# Patient Record
Sex: MALE | Race: WHITE | Employment: OTHER | URBAN - METROPOLITAN AREA
[De-identification: names, ages, dates, MRNs, and addresses within clinical notes are randomized per-mention and may not be internally consistent; named-entity substitution may affect disease eponyms.]

---

## 2018-06-26 ENCOUNTER — HOSPITAL ENCOUNTER (OUTPATIENT)
Age: 58
Discharge: HOME | End: 2018-06-28
Payer: COMMERCIAL

## 2018-06-26 PROCEDURE — 83036 HEMOGLOBIN GLYCOSYLATED A1C: CPT

## 2018-06-27 LAB — HBA1C MFR BLD: 7.3 % (ref 4–5.6)

## 2019-10-22 ENCOUNTER — HOSPITAL ENCOUNTER (OUTPATIENT)
Age: 59
Discharge: HOME | End: 2019-10-24
Payer: COMMERCIAL

## 2019-10-22 ENCOUNTER — HOSPITAL ENCOUNTER (OUTPATIENT)
Dept: GENERAL RADIOLOGY | Age: 59
Discharge: HOME | End: 2019-10-24
Payer: COMMERCIAL

## 2019-10-22 ENCOUNTER — HOSPITAL ENCOUNTER (OUTPATIENT)
Age: 59
Discharge: HOME | End: 2019-10-22
Payer: COMMERCIAL

## 2019-10-22 DIAGNOSIS — N20.0 KIDNEY STONE: ICD-10-CM

## 2019-10-22 LAB
ANION GAP SERPL CALCULATED.3IONS-SCNC: 10 MMOL/L (ref 7–16)
BUN BLDV-MCNC: 12 MG/DL (ref 6–20)
CALCIUM SERPL-MCNC: 9.3 MG/DL (ref 8.6–10.2)
CHLORIDE BLD-SCNC: 101 MMOL/L (ref 98–107)
CO2: 30 MMOL/L (ref 22–29)
CREAT SERPL-MCNC: 0.8 MG/DL (ref 0.7–1.2)
GFR AFRICAN AMERICAN: >60
GFR NON-AFRICAN AMERICAN: >60 ML/MIN/1.73
GLUCOSE BLD-MCNC: 162 MG/DL (ref 74–99)
POTASSIUM SERPL-SCNC: 4.4 MMOL/L (ref 3.5–5)
PROSTATE SPECIFIC ANTIGEN: 1.86 NG/ML (ref 0–4)
SODIUM BLD-SCNC: 141 MMOL/L (ref 132–146)
URIC ACID, SERUM: 3.2 MG/DL (ref 3.4–7)

## 2019-10-22 PROCEDURE — 80048 BASIC METABOLIC PNL TOTAL CA: CPT

## 2019-10-22 PROCEDURE — 36415 COLL VENOUS BLD VENIPUNCTURE: CPT

## 2019-10-22 PROCEDURE — 74018 RADEX ABDOMEN 1 VIEW: CPT

## 2019-10-22 PROCEDURE — 84153 ASSAY OF PSA TOTAL: CPT

## 2019-10-22 PROCEDURE — 84550 ASSAY OF BLOOD/URIC ACID: CPT

## 2019-12-10 ENCOUNTER — TELEPHONE (OUTPATIENT)
Dept: ADMINISTRATIVE | Age: 59
End: 2019-12-10

## 2020-02-12 ENCOUNTER — OFFICE VISIT (OUTPATIENT)
Dept: ENDOCRINOLOGY | Age: 60
End: 2020-02-12
Payer: COMMERCIAL

## 2020-02-12 VITALS
HEIGHT: 70 IN | SYSTOLIC BLOOD PRESSURE: 136 MMHG | DIASTOLIC BLOOD PRESSURE: 88 MMHG | HEART RATE: 78 BPM | OXYGEN SATURATION: 98 % | WEIGHT: 223 LBS | BODY MASS INDEX: 31.92 KG/M2 | RESPIRATION RATE: 16 BRPM

## 2020-02-12 PROCEDURE — G8484 FLU IMMUNIZE NO ADMIN: HCPCS | Performed by: INTERNAL MEDICINE

## 2020-02-12 PROCEDURE — 2022F DILAT RTA XM EVC RTNOPTHY: CPT | Performed by: INTERNAL MEDICINE

## 2020-02-12 PROCEDURE — 3046F HEMOGLOBIN A1C LEVEL >9.0%: CPT | Performed by: INTERNAL MEDICINE

## 2020-02-12 PROCEDURE — 4004F PT TOBACCO SCREEN RCVD TLK: CPT | Performed by: INTERNAL MEDICINE

## 2020-02-12 PROCEDURE — 99204 OFFICE O/P NEW MOD 45 MIN: CPT | Performed by: INTERNAL MEDICINE

## 2020-02-12 PROCEDURE — 3017F COLORECTAL CA SCREEN DOC REV: CPT | Performed by: INTERNAL MEDICINE

## 2020-02-12 PROCEDURE — G8427 DOCREV CUR MEDS BY ELIG CLIN: HCPCS | Performed by: INTERNAL MEDICINE

## 2020-02-12 PROCEDURE — G8417 CALC BMI ABV UP PARAM F/U: HCPCS | Performed by: INTERNAL MEDICINE

## 2020-02-12 RX ORDER — LOSARTAN POTASSIUM 100 MG/1
100 TABLET ORAL DAILY
COMMUNITY
End: 2021-08-24

## 2020-02-12 RX ORDER — ZOLPIDEM TARTRATE 10 MG/1
5 TABLET ORAL NIGHTLY PRN
COMMUNITY

## 2020-02-12 RX ORDER — GLIMEPIRIDE 2 MG/1
2 TABLET ORAL
COMMUNITY
End: 2020-02-12

## 2020-02-12 RX ORDER — ATORVASTATIN CALCIUM 40 MG/1
40 TABLET, FILM COATED ORAL DAILY
COMMUNITY

## 2020-02-12 RX ORDER — METFORMIN HYDROCHLORIDE 500 MG/1
2000 TABLET, EXTENDED RELEASE ORAL
COMMUNITY
End: 2021-08-24 | Stop reason: SDUPTHER

## 2020-02-12 RX ORDER — GLIPIZIDE 10 MG/1
10 TABLET, FILM COATED, EXTENDED RELEASE ORAL DAILY
Qty: 30 TABLET | Refills: 5 | Status: SHIPPED
Start: 2020-02-12 | End: 2020-06-16 | Stop reason: SDUPTHER

## 2020-02-12 NOTE — PATIENT INSTRUCTIONS
Recommendations for today's visit  · Continue Metformin 2000 mg daily   · Stop Glimepiride   · Start Glipizide extended release 10 mg daily   · Check Blood sugar 2 times/day and send us sugar log in a week   · Onetouch ultra, Freestyle Lite,     These are your blood sugar, blood pressure, cholesterol and A1c goals:  · Blood sugar fastin mg/dl to 130 mg/dl  · Blood sugar before meals: <150 mg/dl  · Peak blood sugar lower than 180 mg/dl  · Bad cholesterol (LDL cholesterol): less than 100 mg/dl  · Blood pressure: less than 140/80 mmHg\  · A1c: between 6.5 - 7%      Steps for managing low blood sugar  1. Eat 15 grams of glucose of simple carbohydrate, as found in:   1 tablespoon sugar, Loretta or corn syrup    4 oz (1/2 cup) of juice or regular soda   Glucose Tablet or gel (follow package instruction)   2. Wait 15 min and check blood sugar again   3. Repeat until blood sugar within range  4. Once within range, follow up with snack or meal within 1 hour      I you have any questions please call Dr. Mary Dennis office       Pedro Bean MD  Endocrinologist, [facility])   [facility] [address] [place] [address]   Phone: [phone]  Fax: [phone]  Email: [email or web address]

## 2020-02-12 NOTE — PROGRESS NOTES
[address] [place] [address] Department of Endocrinology Diabetes and Metabolism   [address] [facility] [address]   Phone: [phone]  Fax: [phone]    Date of Service: 2/12/2020    Primary Care Physician: Eric Burleson MD  Referring physician: Erlinda Tamayo MD  Provider: Krishna Shane MD     Reason for the visit:  DM type 2     History of Present Illness: The history is provided by the patient. No  was used. Accuracy of the patient data is excellent. Blanco Ledezma is a very pleasant 64 y.o. male seen today for diabetes management     Blanco Ledezma was diagnosed with diabetes at age 50   The patient is currently on Metformin 500 mg 2000 Mg daily, Glimepiride 2 mg daily   The patient has been checking blood sugar daiy in am. I reviewed point-of-care blood glucose levels   Most recent A1c results summarized below  Lab Results   Component Value Date    LABA1C 7.3 06/26/2018    LABA1C 7.3 10/08/2012     Patient has had no hypoglycemic episodes   The patient has been mindful of what has been eating and following diabetic diet as encouraged  The patient hasn't been mindful of what has been eating and wasn't following diabetes diet as encouraged   I reviewed current medications and the patient has no issues with diabetes medications  Blanco Ledezma is up to date with eye exam and denied any history of diabetic retinopathy  The patient seeing podiatrist every 4 weeks   Microvascular complications:  No Retinopathy, Nephropathy + Neuropathy   Macrovascular complications: no CAD, PVD, or Stroke  The patient refuses Flushot and pneumonia vaccine     PAST MEDICAL HISTORY   Past Medical History:   Diagnosis Date    Hypertension      PAST SURGICAL HISTORY   No past surgical history on file. SOCIAL HISTORY   Tobacco:   reports that he has never smoked. He has never used smokeless tobacco.  Alcohol:   reports current alcohol use.   Drugs:   reports no history of drug without long-term current use of insulin (HCC)  glipiZIDE (GLUCOTROL XL) 10 MG extended release tablet    Ambulatory referral to Diabetic Education     Larisa Rivera MD  Endocrinologist, [facility] Diabetes Care and Endocrinology   [facility] [address] [facility] [address]   Phone: [phone]  Fax: [phone]  --------------------------------------------  Electronically signed by Madalyn Prado MD

## 2020-02-12 NOTE — LETTER
[address] [place] [address] Department of Endocrinology Diabetes and Metabolism   [address] [place] [address]   Phone: [phone]  Fax: [phone]      Provider: Arlette Jacobsen MD  Primary Care Physician: Rafael Crisostomo MD   Referring Provider: Enrrique Herrera MD    Patient: Emeli Boyd  YOB: 1960  Date of Visit: 2/12/2020      Dear Dr. Rafael Crisostomo MD   I had the pleasure of seeing your patient Emeli Boyd today at endocrine clinic for consultation visit and I enclosed a copy of the office visit completed today. Thank you very much for asking us to participate in the care of this very pleasant patient. Please don't hesitate to call if there are any further questions or concerns. Sincerely   Arlette Jacobsen MD  Endocrinologist, [address] [address]   Phone: [phone]  Fax: [phone]      [address] [place] [address] Department of Endocrinology Diabetes and Metabolism   [address] [place] [address]   Phone: [phone]  Fax: [phone]    Date of Service: 2/12/2020    Primary Care Physician: Rafael Crisostomo MD  Referring physician: Enrrique Herrera MD  Provider: Arlette Jacobsen MD     Reason for the visit:  DM type 2     History of Present Illness: The history is provided by the patient. No  was used. Accuracy of the patient data is excellent.   Emeli Boyd is a very pleasant 64 y.o. male seen today for diabetes management     Emeli Boyd was diagnosed with diabetes at age 50   The patient is currently on Metformin 500 mg 2000 Mg daily, Glimepiride 2 mg daily   The patient has been checking blood sugar daiy in am. I reviewed point-of-care blood glucose levels   Most recent A1c results summarized below  Lab Results   Component Value Date    LABA1C 7.3 06/26/2018    LABA1C 7.3 10/08/2012     Patient has had no hypoglycemic episodes No current facility-administered medications for this visit. Review of Systems  Constitutional: No fever, no chills, no diaphoresis, no generalized weakness. HEENT: No blurred vision, No sore throat, no ear pain, no hair loss  Neck: denied any neck swelling, difficulty swallowing,   Cardio-pulmonary: No CP, SOB or palpitation, No orthopnea or PND. No cough or wheezing. GI: No N/V/D, no constipation, No abdominal pain, no melena or hematochezia   : Denied any dysuria, hematuria, flank pain, discharge, or incontinence. Skin: denied any rash, ulcer, Hirsute, or hyperpigmentation. MSK: denied any joint deformity, joint pain/swelling, muscle pain, or back pain. Neuro: no numbness, no tingling, no weakness, _    OBJECTIVE    /88 (Site: Right Upper Arm, Position: Sitting, Cuff Size: Medium Adult)   Pulse 78   Resp 16   Ht 5' 10\" (1.778 m)   Wt 223 lb (101.2 kg)   SpO2 98%   BMI 32.00 kg/m²    BP Readings from Last 4 Encounters:   02/12/20 136/88     Wt Readings from Last 6 Encounters:   02/12/20 223 lb (101.2 kg)       Physical examination:  General: awake alert, oriented x3, no abnormal position or movements. HEENT: normocephalic non-traumatic, no exophthalmos   Neck: supple, no LN enlargement, no thyromegaly, no thyroid tenderness, no JVD. Pulm: Clear equal air entry no added sounds, no wheezing or rhonchi    CVS: S1 + S2, no murmur, no heave. Dorsalis pedis pulse palpable   Abd: soft lax, no tenderness, no organomegaly, audible bowel sounds. Skin: warm, no lesions, no rash.  No callus, no Ulcers, No acanthosis nigricans  Musculoskeletal: No back tenderness, no kyphosis/scoliosis    Neuro: CN intact, Monofilament sensation decreased bilateral , muscle power normal  Psych: normal mood, and affect      Review of Laboratory Data:  I personally reviewed the following lab:  Lab Results   Component Value Date/Time    WBC 5.7 10/08/2012 11:46 AM    RBC 4.82 10/08/2012 11:46 AM HGB 14.4 10/08/2012 11:46 AM    HCT 42.9 10/08/2012 11:46 AM    MCV 89.0 10/08/2012 11:46 AM    MCH 29.9 10/08/2012 11:46 AM    MCHC 33.5 10/08/2012 11:46 AM    RDW 13.9 10/08/2012 11:46 AM     10/08/2012 11:46 AM    MPV 9.7 10/08/2012 11:46 AM      Lab Results   Component Value Date/Time     10/22/2019 11:06 AM    K 4.4 10/22/2019 11:06 AM    CO2 30 (H) 10/22/2019 11:06 AM    BUN 12 10/22/2019 11:06 AM    CREATININE 0.8 10/22/2019 11:06 AM    CALCIUM 9.3 10/22/2019 11:06 AM    LABGLOM >60 10/22/2019 11:06 AM    GFRAA >60 10/22/2019 11:06 AM      Lab Results   Component Value Date/Time    TSH 1.339 10/08/2012 11:46 AM     Lab Results   Component Value Date    LABA1C 7.3 06/26/2018    GLUCOSE 162 10/22/2019     Lab Results   Component Value Date    LABA1C 7.3 06/26/2018    LABA1C 7.3 10/08/2012     Lab Results   Component Value Date    TRIG 74 10/08/2012    HDL 61.3 10/08/2012    LDLCALC 122 10/08/2012    CHOL 198 10/08/2012     No results found for: [address] [place] [address] [address] Records/Labs/Images review:   I personally reviewed and summarized previous records   All labs and imaging studies were independently reviewed     [address] [place] [address], a 64 y.o.-old male seen in for the following issues     Diabetes Mellitus Type 2     · A1c 7.3%   · Continue Metformin 2000 mg daily   · Start Glipizide extended release 10 mg daily   · Check Blood sugar 2 times/day and send us sugar log in a week  · Discussed with patient A1c and blood sugar goals   · Optimal blood sugars: 100-140 pre-prandial, < 180 peak post-prandial  · The patient counseled about the complications of uncontrolled diabetes   · Patient was counselled about the importance of self-blood glucose monitoring and eating consistent carb diet to avoid blood sugar fluctuations   · Patient will need routine diabetes maintenance and prevention  · The patient was referred to diabetic educator for further teaching · Discussed lifestyle changes including diet and exercise with patient; recommended 150 minutes of moderate intensity exercise per week. · Diabetes labs before next visit     Dietary noncompliance  ? Discussed with patient the importance of eating consistent carbohydrate meals, avoiding high glycemic index food. Also, discussed with patient the risk and negative consequences of dietary noncompliance on blood glucose control, blood pressure and weight    Obesity  ? Discussed lifestyle changes including diet and exercise with patient in depth. Also discussed with patient cardiovascular risk associated with obesity    Return in about 4 months (around 6/12/2020) for DM type 2 . The above issues were reviewed with the patient who understood and agreed with the plan    Thank you for allowing us to participate in the care of this patient. Please do not hesitate to contact us with any additional questions. Diagnosis Orders   1.  Type 2 diabetes mellitus without complication, without long-term current use of insulin (HCC)  glipiZIDE (GLUCOTROL XL) 10 MG extended release tablet    Ambulatory referral to Diabetic Education     Giselle Medrano MD  Endocrinologist, [facility] Diabetes Care and Endocrinology   [address] [facility] [address]   Phone: [phone]  Fax: [phone]  --------------------------------------------  Electronically signed by Irene Grimm MD

## 2020-02-19 ENCOUNTER — HOSPITAL ENCOUNTER (OUTPATIENT)
Dept: DIABETES SERVICES | Age: 60
Setting detail: THERAPIES SERIES
Discharge: HOME | End: 2020-02-19
Payer: COMMERCIAL

## 2020-02-19 VITALS — HEIGHT: 70 IN | BODY MASS INDEX: 30.78 KG/M2 | WEIGHT: 215 LBS

## 2020-02-19 PROCEDURE — 97802 MEDICAL NUTRITION INDIV IN: CPT | Performed by: DIETITIAN, REGISTERED

## 2020-02-19 SDOH — ECONOMIC STABILITY: FOOD INSECURITY: ADDITIONAL INFORMATION: NO

## 2020-02-19 ASSESSMENT — PATIENT HEALTH QUESTIONNAIRE - PHQ9
1. LITTLE INTEREST OR PLEASURE IN DOING THINGS: 0
SUM OF ALL RESPONSES TO PHQ QUESTIONS 1-9: 0
SUM OF ALL RESPONSES TO PHQ9 QUESTIONS 1 & 2: 0
SUM OF ALL RESPONSES TO PHQ QUESTIONS 1-9: 0
2. FEELING DOWN, DEPRESSED OR HOPELESS: 0

## 2020-02-19 NOTE — PROGRESS NOTES
Diabetes Self-Management Education Record    Participant Name: Ted Valiente  Referring Provider: Duane Gals, MD  Assessment/Evaluation Ratings:  1=Needs Instruction   4=Demonstrates Understanding/Competency  2=Needs Review   NC=Not Covered    3=Comprehends Key Points  N/A=Not Applicable  Topics/Learning Objectives Pre-session Assess Date:  2/19/20-JA Instr. Date Reinforce Date Post- session Eval Comments   Diabetes disease process & Treatment process: Define diabetes & prediabetes; Identify own type of diabetes; role of the pancreas; signs/symptoms; diagnostic criteria; prevention & treatment options; contributing factors. 1    Type 2, diagnosed 4 - 5 years ago   Incorporating nutritional management into lifestyle: Describe effect of type, amount & timing of food on blood glucose; Describe basic meal planning techniques & current nutrition guidelines;Correctly read food labels & demonstrate CHO counting & portion control with personalized meal plan. Identify dining out strategies, & dietary sick day guidelines. 1 2/19/2020-JA  3 2/19/2020 attended with veda Andres. Instructed on 2100 calorie meal plan, 4 carbohydrate choices at each meal and 1 - 2 in between each meal. 10 oz protein, 6 fats daily. Provided alcohol guidelines. Pt admits he snacks frequently and eat larger portions at meals    Incorporating physical activity into lifestyle:   Verbalize effect of exercise on blood glucose levels; benefits of regular exercise; safety considerations; contraindications; maintenance of activity. 1    Not active   Using medications safely:  Identify effects of diabetes medicines on blood glucose levels; List diabetes medication taken, action & side effects; appropriate injection sites; proper storage; supplies needed; proper technique; safe needle disposal guidelines.  1    Metformin 2000 XL at breakfast  Glipizide XL at breakfast   Monitoring blood glucose, interpreting and using results:  Identify recommended & personal 15:00     Meter Instrx        Insulin Instrx            Additional Comments:    DSMES Follow-up plan/Date:   Contact Post Class Regarding:   HgbA1C   Weight   Hypertension  Follow-up with Physician  Medication compliance   Plate method/meal plan compliance   Self-Foot Exam Frequency   Monitoring Frequency   Exercise Routine   Goal Attainment  Personal Support Plan:      [x] Keep all scheduled doctor appointments   [] Make and keep appointments with specialists (foot, eye, dentist) as recommended   [] Consult my pharmacist about all new medications or to ask any medication questions   [] Get tested for sleep apnea   [] Seek help for:   [] Make an appointment with:   [] Attend smoking cessation classes or call 1-800-QUIT-NOW  [] Attend Diabetes Support Group   [] Use diabetes magazines, books, or credible web-sites like the ADA for more information  [] Increase exercise at home or join an exercise program:   [] Other:

## 2020-02-24 ENCOUNTER — TELEPHONE (OUTPATIENT)
Dept: ENDOCRINOLOGY | Age: 60
End: 2020-02-24

## 2020-02-24 RX ORDER — GLIPIZIDE 5 MG/1
TABLET, FILM COATED, EXTENDED RELEASE ORAL
Qty: 90 TABLET | Refills: 3 | Status: SHIPPED
Start: 2020-02-24 | End: 2020-06-16

## 2020-06-16 ENCOUNTER — VIRTUAL VISIT (OUTPATIENT)
Dept: ENDOCRINOLOGY | Age: 60
End: 2020-06-16
Payer: COMMERCIAL

## 2020-06-16 PROBLEM — E11.9 TYPE 2 DIABETES MELLITUS WITHOUT COMPLICATION, WITHOUT LONG-TERM CURRENT USE OF INSULIN (HCC): Status: ACTIVE | Noted: 2020-06-16

## 2020-06-16 PROBLEM — E55.9 VITAMIN D DEFICIENCY: Status: ACTIVE | Noted: 2020-06-16

## 2020-06-16 PROBLEM — E78.5 HYPERLIPIDEMIA: Status: ACTIVE | Noted: 2020-06-16

## 2020-06-16 PROCEDURE — 4004F PT TOBACCO SCREEN RCVD TLK: CPT | Performed by: INTERNAL MEDICINE

## 2020-06-16 PROCEDURE — 3017F COLORECTAL CA SCREEN DOC REV: CPT | Performed by: INTERNAL MEDICINE

## 2020-06-16 PROCEDURE — 99214 OFFICE O/P EST MOD 30 MIN: CPT | Performed by: INTERNAL MEDICINE

## 2020-06-16 PROCEDURE — G8417 CALC BMI ABV UP PARAM F/U: HCPCS | Performed by: INTERNAL MEDICINE

## 2020-06-16 PROCEDURE — 2022F DILAT RTA XM EVC RTNOPTHY: CPT | Performed by: INTERNAL MEDICINE

## 2020-06-16 PROCEDURE — G8427 DOCREV CUR MEDS BY ELIG CLIN: HCPCS | Performed by: INTERNAL MEDICINE

## 2020-06-16 PROCEDURE — 3046F HEMOGLOBIN A1C LEVEL >9.0%: CPT | Performed by: INTERNAL MEDICINE

## 2020-06-16 RX ORDER — GLIPIZIDE 10 MG/1
TABLET, FILM COATED, EXTENDED RELEASE ORAL
Qty: 180 TABLET | Refills: 1 | Status: SHIPPED
Start: 2020-06-16 | End: 2020-12-01 | Stop reason: SDUPTHER

## 2020-06-16 RX ORDER — PANTOPRAZOLE SODIUM 40 MG/1
40 TABLET, DELAYED RELEASE ORAL DAILY
COMMUNITY

## 2020-06-16 NOTE — PROGRESS NOTES
TeleHealth encounter, evaluation of the following organ systems is limited: EENT/Resp/CV/GI//MS/Neuro/Skin/Heme-Lymph-Imm. General: awake alert, oriented x3, no abnormal position or movements.   Pulm: move with respiration   Skin: no rash  Psych: normal mood, and affect      Review of Laboratory Data:  I personally reviewed the following lab:  Lab Results   Component Value Date/Time    WBC 5.7 10/08/2012 11:46 AM    RBC 4.82 10/08/2012 11:46 AM    HGB 14.4 10/08/2012 11:46 AM    HCT 42.9 10/08/2012 11:46 AM    MCV 89.0 10/08/2012 11:46 AM    MCH 29.9 10/08/2012 11:46 AM    MCHC 33.5 10/08/2012 11:46 AM    RDW 13.9 10/08/2012 11:46 AM     10/08/2012 11:46 AM    MPV 9.7 10/08/2012 11:46 AM      Lab Results   Component Value Date/Time     10/22/2019 11:06 AM    K 4.4 10/22/2019 11:06 AM    CO2 30 (H) 10/22/2019 11:06 AM    BUN 12 10/22/2019 11:06 AM    CREATININE 0.8 10/22/2019 11:06 AM    CALCIUM 9.3 10/22/2019 11:06 AM    LABGLOM >60 10/22/2019 11:06 AM    GFRAA >60 10/22/2019 11:06 AM      Lab Results   Component Value Date/Time    TSH 1.339 10/08/2012 11:46 AM     Lab Results   Component Value Date    LABA1C 7.3 06/26/2018    GLUCOSE 162 10/22/2019     Lab Results   Component Value Date    LABA1C 7.3 06/26/2018    LABA1C 7.3 10/08/2012     Lab Results   Component Value Date    TRIG 74 10/08/2012    HDL 61.3 10/08/2012    LDLCALC 122 10/08/2012    CHOL 198 10/08/2012     No results found for: [address] [place] [address] [address] Records/Labs/Images review:   I personally reviewed and summarized previous records   All labs and imaging studies were independently reviewed     ASSESSMENT & RECOMMENDATIONS   Jude Merino, a 64 y.o.-old male seen in for the following issues     Diabetes Mellitus Type 2     · A1c 7.3%   · Continue Metformin 2000 mg daily , change Glipizide extended release to 20 mg daily   · Check Blood sugar 2 times/day and send us sugar log in a week  · Discussed with patient A1c and blood sugar goals · Patient will need routine diabetes maintenance and prevention  · Diabetes labs before next visit     Dietary noncompliance   Improved since last visit    Discussed with patient the importance of eating consistent carbohydrate meals, avoiding high glycemic index food. Also, discussed with patient the risk and negative consequences of dietary noncompliance on blood glucose control, blood pressure and weight    HLD   · Continue Lipitor 40 mg daily  · Lipid panel before next visit      No follow-ups on file. The above issues were reviewed with the patient who understood and agreed with the plan    Thank you for allowing us to participate in the care of this patient. Please do not hesitate to contact us with any additional questions. Diagnosis Orders   1. Vitamin D deficiency  Vitamin D 25 Hydroxy   2. Type 2 diabetes mellitus without complication, without long-term current use of insulin (HCC)  glipiZIDE (GLUCOTROL XL) 10 MG extended release tablet    Basic Metabolic Panel    Hemoglobin A1C    Microalbumin / Creatinine Urine Ratio   3.  Hyperlipidemia, unspecified hyperlipidemia type  Lipid Panel     Radha Stewart MD  Endocrinologist, [facility] Diabetes Care and Endocrinology   [address] [place] [address]   Phone: [phone]  Fax: [phone]  --------------------------------------------  Electronically signed by Edvin Granados MD

## 2020-10-13 ENCOUNTER — HOSPITAL ENCOUNTER (OUTPATIENT)
Age: 60
Discharge: HOME | End: 2020-10-15
Payer: COMMERCIAL

## 2020-10-13 LAB
ALBUMIN SERPL-MCNC: 4.5 G/DL (ref 3.5–5.2)
ALP BLD-CCNC: 88 U/L (ref 40–129)
ALT SERPL-CCNC: 50 U/L (ref 0–40)
AST SERPL-CCNC: 36 U/L (ref 0–39)
BILIRUB SERPL-MCNC: 0.3 MG/DL (ref 0–1.2)
BILIRUBIN DIRECT: <0.2 MG/DL (ref 0–0.3)
BILIRUBIN, INDIRECT: ABNORMAL MG/DL (ref 0–1)
TOTAL PROTEIN: 7 G/DL (ref 6.4–8.3)

## 2020-10-13 PROCEDURE — 80076 HEPATIC FUNCTION PANEL: CPT

## 2020-10-26 ENCOUNTER — OFFICE VISIT (OUTPATIENT)
Dept: ENDOCRINOLOGY | Age: 60
End: 2020-10-26
Payer: COMMERCIAL

## 2020-10-26 VITALS
BODY MASS INDEX: 32.92 KG/M2 | WEIGHT: 229.4 LBS | DIASTOLIC BLOOD PRESSURE: 94 MMHG | SYSTOLIC BLOOD PRESSURE: 160 MMHG | RESPIRATION RATE: 16 BRPM | HEART RATE: 90 BPM

## 2020-10-26 LAB — HBA1C MFR BLD: 6.5 %

## 2020-10-26 PROCEDURE — 83036 HEMOGLOBIN GLYCOSYLATED A1C: CPT | Performed by: INTERNAL MEDICINE

## 2020-10-26 PROCEDURE — G8428 CUR MEDS NOT DOCUMENT: HCPCS | Performed by: INTERNAL MEDICINE

## 2020-10-26 PROCEDURE — 1036F TOBACCO NON-USER: CPT | Performed by: INTERNAL MEDICINE

## 2020-10-26 PROCEDURE — 2022F DILAT RTA XM EVC RTNOPTHY: CPT | Performed by: INTERNAL MEDICINE

## 2020-10-26 PROCEDURE — 3017F COLORECTAL CA SCREEN DOC REV: CPT | Performed by: INTERNAL MEDICINE

## 2020-10-26 PROCEDURE — G8417 CALC BMI ABV UP PARAM F/U: HCPCS | Performed by: INTERNAL MEDICINE

## 2020-10-26 PROCEDURE — 99214 OFFICE O/P EST MOD 30 MIN: CPT | Performed by: INTERNAL MEDICINE

## 2020-10-26 PROCEDURE — G8484 FLU IMMUNIZE NO ADMIN: HCPCS | Performed by: INTERNAL MEDICINE

## 2020-10-26 PROCEDURE — 3044F HG A1C LEVEL LT 7.0%: CPT | Performed by: INTERNAL MEDICINE

## 2020-10-26 NOTE — PROGRESS NOTES
Medications   Medication Sig Dispense Refill    pantoprazole (PROTONIX) 40 MG tablet Take 40 mg by mouth daily      glipiZIDE (GLUCOTROL XL) 10 MG extended release tablet Take 2 tablets (20 mg) daily in the morning 180 tablet 1    metFORMIN (GLUCOPHAGE-XR) 500 MG extended release tablet Take 2,000 mg by mouth daily (with breakfast)      losartan (COZAAR) 100 MG tablet Take 100 mg by mouth daily      atorvastatin (LIPITOR) 40 MG tablet Take 40 mg by mouth daily      zolpidem (AMBIEN) 10 MG tablet Take 5 mg by mouth nightly as needed for Sleep.  Omega-3 Fatty Acids (FISH OIL PO) Take by mouth daily      Multiple Vitamins-Minerals (MULTIVITAL) TABS Take by mouth daily      aspirin 81 MG tablet Take 81 mg by mouth daily      Cholecalciferol (VITAMIN D3) 125 MCG (5000 UT) TABS Take by mouth daily      ibuprofen (IBU) 600 MG tablet Take 1 tablet by mouth every 8 hours as needed for Pain. 20 tablet 0     No current facility-administered medications for this visit. Review of Systems  Constitutional: No fever, no chills, no diaphoresis, no generalized weakness. HEENT: No blurred vision, No sore throat, no ear pain, no hair loss  Neck: denied any neck swelling, difficulty swallowing,   Cardio-pulmonary: No CP, SOB or palpitation, No orthopnea or PND. No cough or wheezing. GI: No N/V/D, no constipation, No abdominal pain, no melena or hematochezia   : Denied any dysuria, hematuria, flank pain, discharge, or incontinence. Skin: denied any rash, ulcer, Hirsute, or hyperpigmentation. MSK: denied any joint deformity, joint pain/swelling, muscle pain, or back pain.   Neuro: no numbness, no tingling, no weakness, _    OBJECTIVE    BP (!) 160/94 (Site: Left Upper Arm, Position: Sitting, Cuff Size: Medium Adult)   Pulse 90   Resp 16   Wt 229 lb 6.4 oz (104.1 kg)   BMI 32.92 kg/m²   BP Readings from Last 4 Encounters:   10/26/20 (!) 160/94   02/12/20 136/88     Wt Readings from Last 6 Encounters: 10/26/20 229 lb 6.4 oz (104.1 kg)   02/19/20 215 lb (97.5 kg)   02/12/20 223 lb (101.2 kg)     Physical examination:  General: awake alert, oriented x3, no abnormal position or movements. HEENT: normocephalic non traumatic, no exophthalmos   Neck: supple, no LN enlargement, no thyromegaly, no thyroid tenderness, no JVD. Pulm: Clear equal air entry no added sounds, no wheezing or rhonchi    CVS: S1 + S2, no murmur, no heave. Dorsalis pedis pulse palpable   Abd: soft lax, no tenderness, no organomegaly, audible bowel sounds. Skin: warm, no lesions, no rash.  No callus, fungal infection at Lt big toe, o Ulcers, No acanthosis nigricans   Neuro: CN intact, sensation present bilateral , muscle power normal  Psych: normal mood, and affect    Review of Laboratory Data:  I personally reviewed the following lab:  Lab Results   Component Value Date/Time    WBC 5.7 10/08/2012 11:46 AM    RBC 4.82 10/08/2012 11:46 AM    HGB 14.4 10/08/2012 11:46 AM    HCT 42.9 10/08/2012 11:46 AM    MCV 89.0 10/08/2012 11:46 AM    MCH 29.9 10/08/2012 11:46 AM    MCHC 33.5 10/08/2012 11:46 AM    RDW 13.9 10/08/2012 11:46 AM     10/08/2012 11:46 AM    MPV 9.7 10/08/2012 11:46 AM      Lab Results   Component Value Date/Time     10/22/2019 11:06 AM    K 4.4 10/22/2019 11:06 AM    CO2 30 (H) 10/22/2019 11:06 AM    BUN 12 10/22/2019 11:06 AM    CREATININE 0.8 10/22/2019 11:06 AM    CALCIUM 9.3 10/22/2019 11:06 AM    LABGLOM >60 10/22/2019 11:06 AM    GFRAA >60 10/22/2019 11:06 AM      Lab Results   Component Value Date/Time    TSH 1.339 10/08/2012 11:46 AM     Lab Results   Component Value Date    LABA1C 6.5 10/26/2020    GLUCOSE 162 10/22/2019     Lab Results   Component Value Date    LABA1C 6.5 10/26/2020    LABA1C 7.3 06/26/2018    LABA1C 7.3 10/08/2012     Lab Results   Component Value Date    TRIG 74 10/08/2012    HDL 61.3 10/08/2012    LDLCALC 122 10/08/2012    CHOL 198 10/08/2012     No results found for: [address] [place] [address] [address] Records/Labs/Images review:   I personally reviewed and summarized previous records   All labs and imaging studies were independently reviewed     Gonzalo Moffett, a 64 y.o.-old male seen in for the following issues     Diabetes Mellitus Type 2     · Under excellent control, A1c 6.5 %  · Continue Metformin 2000 mg daily , change Glipizide extended release to 20 mg daily   · Check Blood sugar 2 times/day and send us sugar log in a week  · Discussed with patient A1c and blood sugar goals     Dietary noncompliance   Improved    Discussed with patient the importance of eating consistent carbohydrate meals, avoiding high glycemic index food. Also, discussed with patient the risk and negative consequences of dietary noncompliance on blood glucose control, blood pressure and weight    HLD   · Continue Lipitor 40 mg daily    Return in about 4 months (around 2/26/2021) for DM type 2 . The above issues were reviewed with the patient who understood and agreed with the plan    Thank you for allowing us to participate in the care of this patient. Please do not hesitate to contact us with any additional questions. Diagnosis Orders   1. Type 2 diabetes mellitus without complication, without long-term current use of insulin (HCC)  POCT glycosylated hemoglobin (Hb A1C)   2. Vitamin D deficiency     3. Hyperlipidemia, unspecified hyperlipidemia type       Hilton James MD  Endocrinologist, [facility] Diabetes Care and Endocrinology   [address] [place] [address]   Phone: [phone]  Fax: [phone]  --------------------------------------------  An electronic signature was used to authenticate this note.  Raymunod Lozada MD on 10/26/2020 at 4:13 PM

## 2020-12-01 RX ORDER — GLIPIZIDE 10 MG/1
TABLET, FILM COATED, EXTENDED RELEASE ORAL
Qty: 180 TABLET | Refills: 1 | Status: SHIPPED
Start: 2020-12-01 | End: 2021-06-20 | Stop reason: SDUPTHER

## 2021-01-15 ENCOUNTER — TELEPHONE (OUTPATIENT)
Dept: ENDOCRINOLOGY | Age: 61
End: 2021-01-15

## 2021-01-15 NOTE — TELEPHONE ENCOUNTER
Pt called he is currently taking 2000Mg of metformin daily. He was having pain near his elbow joint. He stated his friend had similar issues on metformin. He wanted to know if that could be the cause.

## 2021-01-17 NOTE — TELEPHONE ENCOUNTER
Metformin is less likely to be the cause of this pain.    If pain continued, Please check with your PCP to make sure that there is nothing else causing this pain

## 2021-01-25 LAB
AVERAGE GLUCOSE: NORMAL
HBA1C MFR BLD: NORMAL %

## 2021-02-22 ENCOUNTER — OFFICE VISIT (OUTPATIENT)
Dept: ENDOCRINOLOGY | Age: 61
End: 2021-02-22
Payer: COMMERCIAL

## 2021-02-22 VITALS
HEART RATE: 84 BPM | WEIGHT: 226 LBS | OXYGEN SATURATION: 97 % | SYSTOLIC BLOOD PRESSURE: 134 MMHG | DIASTOLIC BLOOD PRESSURE: 72 MMHG | TEMPERATURE: 96.9 F | BODY MASS INDEX: 32.43 KG/M2

## 2021-02-22 DIAGNOSIS — E55.9 VITAMIN D DEFICIENCY: ICD-10-CM

## 2021-02-22 DIAGNOSIS — E78.5 HYPERLIPIDEMIA, UNSPECIFIED HYPERLIPIDEMIA TYPE: ICD-10-CM

## 2021-02-22 DIAGNOSIS — E11.9 TYPE 2 DIABETES MELLITUS WITHOUT COMPLICATION, WITHOUT LONG-TERM CURRENT USE OF INSULIN (HCC): Primary | ICD-10-CM

## 2021-02-22 PROCEDURE — G8417 CALC BMI ABV UP PARAM F/U: HCPCS | Performed by: INTERNAL MEDICINE

## 2021-02-22 PROCEDURE — G8484 FLU IMMUNIZE NO ADMIN: HCPCS | Performed by: INTERNAL MEDICINE

## 2021-02-22 PROCEDURE — 2022F DILAT RTA XM EVC RTNOPTHY: CPT | Performed by: INTERNAL MEDICINE

## 2021-02-22 PROCEDURE — 1036F TOBACCO NON-USER: CPT | Performed by: INTERNAL MEDICINE

## 2021-02-22 PROCEDURE — G8427 DOCREV CUR MEDS BY ELIG CLIN: HCPCS | Performed by: INTERNAL MEDICINE

## 2021-02-22 PROCEDURE — 3046F HEMOGLOBIN A1C LEVEL >9.0%: CPT | Performed by: INTERNAL MEDICINE

## 2021-02-22 PROCEDURE — 99214 OFFICE O/P EST MOD 30 MIN: CPT | Performed by: INTERNAL MEDICINE

## 2021-02-22 PROCEDURE — 3017F COLORECTAL CA SCREEN DOC REV: CPT | Performed by: INTERNAL MEDICINE

## 2021-02-22 NOTE — PROGRESS NOTES
Current Outpatient Medications   Medication Sig Dispense Refill    glipiZIDE (GLUCOTROL XL) 10 MG extended release tablet Take 2 tablets (20 mg) daily in the morning 180 tablet 1    pantoprazole (PROTONIX) 40 MG tablet Take 40 mg by mouth daily      metFORMIN (GLUCOPHAGE-XR) 500 MG extended release tablet Take 2,000 mg by mouth daily (with breakfast)      losartan (COZAAR) 100 MG tablet Take 100 mg by mouth daily      atorvastatin (LIPITOR) 40 MG tablet Take 40 mg by mouth daily      zolpidem (AMBIEN) 10 MG tablet Take 5 mg by mouth nightly as needed for Sleep.  Omega-3 Fatty Acids (FISH OIL PO) Take by mouth daily      Multiple Vitamins-Minerals (MULTIVITAL) TABS Take by mouth daily      aspirin 81 MG tablet Take 81 mg by mouth daily      Cholecalciferol (VITAMIN D3) 125 MCG (5000 UT) TABS Take by mouth daily      ibuprofen (IBU) 600 MG tablet Take 1 tablet by mouth every 8 hours as needed for Pain. 20 tablet 0     No current facility-administered medications for this visit. Review of Systems  Constitutional: No fever, no chills, no diaphoresis, no generalized weakness. HEENT: No blurred vision, No sore throat, no ear pain, no hair loss  Neck: denied any neck swelling, difficulty swallowing,   Cardio-pulmonary: No CP, SOB or palpitation, No orthopnea or PND. No cough or wheezing. GI: No N/V/D, no constipation, No abdominal pain, no melena or hematochezia   : Denied any dysuria, hematuria, flank pain, discharge, or incontinence. Skin: denied any rash, ulcer, Hirsute, or hyperpigmentation. MSK: denied any joint deformity, joint pain/swelling, muscle pain, or back pain.   Neuro: no numbness, no tingling, no weakness, _    OBJECTIVE    /72   Pulse 84   Temp 96.9 °F (36.1 °C)   Wt 226 lb (102.5 kg)   SpO2 97%   BMI 32.43 kg/m²   BP Readings from Last 4 Encounters:   02/22/21 134/72   10/26/20 (!) 160/94   02/12/20 136/88     Wt Readings from Last 6 Encounters: 02/22/21 226 lb (102.5 kg)   10/26/20 229 lb 6.4 oz (104.1 kg)   02/19/20 215 lb (97.5 kg)   02/12/20 223 lb (101.2 kg)     Physical examination:  General: awake alert, oriented x3, no abnormal position or movements. HEENT: normocephalic non traumatic, no exophthalmos   Neck: supple, no LN enlargement, no thyromegaly, no thyroid tenderness, no JVD. Pulm: Clear equal air entry no added sounds, no wheezing or rhonchi    CVS: S1 + S2, no murmur, no heave. Dorsalis pedis pulse palpable   Abd: soft lax, no tenderness, no organomegaly, audible bowel sounds. Skin: warm, no lesions, no rash.  No callus, fungal infection at Lt big toe, no Ulcers, No acanthosis nigricans   Neuro: CN intact, sensation present bilateral , muscle power normal  Psych: normal mood, and affect    Review of Laboratory Data:  I personally reviewed the following lab:  Lab Results   Component Value Date/Time    WBC 5.7 10/08/2012 11:46 AM    RBC 4.82 10/08/2012 11:46 AM    HGB 14.4 10/08/2012 11:46 AM    HCT 42.9 10/08/2012 11:46 AM    MCV 89.0 10/08/2012 11:46 AM    MCH 29.9 10/08/2012 11:46 AM    MCHC 33.5 10/08/2012 11:46 AM    RDW 13.9 10/08/2012 11:46 AM     10/08/2012 11:46 AM    MPV 9.7 10/08/2012 11:46 AM      Lab Results   Component Value Date/Time     10/22/2019 11:06 AM    K 4.4 10/22/2019 11:06 AM    CO2 30 (H) 10/22/2019 11:06 AM    BUN 12 10/22/2019 11:06 AM    CREATININE 0.8 10/22/2019 11:06 AM    CALCIUM 9.3 10/22/2019 11:06 AM    LABGLOM >60 10/22/2019 11:06 AM    GFRAA >60 10/22/2019 11:06 AM      Lab Results   Component Value Date/Time    TSH 1.339 10/08/2012 11:46 AM     Lab Results   Component Value Date    LABA1C 6.5 10/26/2020    GLUCOSE 162 10/22/2019     Lab Results   Component Value Date    LABA1C 6.5 10/26/2020    LABA1C 7.3 06/26/2018    LABA1C 7.3 10/08/2012     Lab Results   Component Value Date    TRIG 74 10/08/2012    HDL 61.3 10/08/2012    LDLCALC 122 10/08/2012    CHOL 198 10/08/2012 No results found for: Favian Quintanilla, a 64 y.o.-old male seen in for the following issues     Diabetes Mellitus Type 2     · Under excellent control, A1c 7.2 %  · Continue Metformin 2000 mg daily , change Glipizide extended release to 20 mg daily   · Check Blood sugar 2 times/day and send us sugar log in a week  · Discussed with patient A1c and blood sugar goals     Dietary noncompliance  ? Improved   ? Discussed with patient the importance of eating consistent carbohydrate meals, avoiding high glycemic index food. Also, discussed with patient the risk and negative consequences of dietary noncompliance on blood glucose control, blood pressure and weight    HLD   · Continue Lipitor 40 mg daily    I personally reviewed external notes from PCP and other patient's care team providers, and personally interpreted labs associated with the above diagnosis. I also ordered labs to further assess and manage the above addressed medical conditions    Return in about 4 months (around 6/22/2021) for DM type 2 . The above issues were reviewed with the patient who understood and agreed with the plan    Thank you for allowing us to participate in the care of this patient. Please do not hesitate to contact us with any additional questions. Diagnosis Orders   1. Type 2 diabetes mellitus without complication, without long-term current use of insulin (HCC)  Hemoglobin A1C   2. Vitamin D deficiency     3. Hyperlipidemia, unspecified hyperlipidemia type       Kranthi Ireland MD  Endocrinologist, [facility]   [address] [place], [place] [address]   Phone: [phone]  Fax: [phone]  --------------------------------------------  An electronic signature was used to authenticate this note.  Galileo Schulz MD on 2/22/2021 at 1:37 PM

## 2021-06-17 DIAGNOSIS — E11.9 TYPE 2 DIABETES MELLITUS WITHOUT COMPLICATION, WITHOUT LONG-TERM CURRENT USE OF INSULIN (HCC): ICD-10-CM

## 2021-06-18 DIAGNOSIS — E11.9 TYPE 2 DIABETES MELLITUS WITHOUT COMPLICATION, WITHOUT LONG-TERM CURRENT USE OF INSULIN (HCC): ICD-10-CM

## 2021-06-18 NOTE — TELEPHONE ENCOUNTER
Last Appointment:  Visit date not found  Future Appointments   Date Time Provider Sukhwinder Amber   6/24/2021  9:45 AM Balwinder Gupta MD [facility]   7/20/2021  2:20 PM Kenneth Zaidi MD [facility]

## 2021-06-20 RX ORDER — GLIPIZIDE 10 MG/1
TABLET, FILM COATED, EXTENDED RELEASE ORAL
Qty: 180 TABLET | Refills: 1 | Status: SHIPPED
Start: 2021-06-20 | End: 2021-08-24 | Stop reason: SDUPTHER

## 2021-06-20 RX ORDER — GLIPIZIDE 10 MG/1
TABLET, FILM COATED, EXTENDED RELEASE ORAL
Qty: 180 TABLET | Refills: 1 | OUTPATIENT
Start: 2021-06-20

## 2021-08-24 ENCOUNTER — OFFICE VISIT (OUTPATIENT)
Dept: ENDOCRINOLOGY | Age: 61
End: 2021-08-24
Payer: COMMERCIAL

## 2021-08-24 VITALS
HEART RATE: 73 BPM | BODY MASS INDEX: 32.07 KG/M2 | SYSTOLIC BLOOD PRESSURE: 154 MMHG | HEIGHT: 70 IN | WEIGHT: 224 LBS | RESPIRATION RATE: 18 BRPM | DIASTOLIC BLOOD PRESSURE: 88 MMHG

## 2021-08-24 DIAGNOSIS — E11.9 TYPE 2 DIABETES MELLITUS WITHOUT COMPLICATION, WITHOUT LONG-TERM CURRENT USE OF INSULIN (HCC): ICD-10-CM

## 2021-08-24 DIAGNOSIS — E66.09 CLASS 1 OBESITY DUE TO EXCESS CALORIES WITHOUT SERIOUS COMORBIDITY WITH BODY MASS INDEX (BMI) OF 32.0 TO 32.9 IN ADULT: ICD-10-CM

## 2021-08-24 DIAGNOSIS — E11.42 TYPE 2 DIABETES MELLITUS WITH DIABETIC POLYNEUROPATHY, WITHOUT LONG-TERM CURRENT USE OF INSULIN (HCC): ICD-10-CM

## 2021-08-24 DIAGNOSIS — E11.9 TYPE 2 DIABETES MELLITUS WITHOUT COMPLICATION, WITHOUT LONG-TERM CURRENT USE OF INSULIN (HCC): Primary | ICD-10-CM

## 2021-08-24 DIAGNOSIS — E78.2 MIXED HYPERLIPIDEMIA: ICD-10-CM

## 2021-08-24 DIAGNOSIS — Z91.119 DIETARY NONCOMPLIANCE: ICD-10-CM

## 2021-08-24 PROCEDURE — G8417 CALC BMI ABV UP PARAM F/U: HCPCS | Performed by: CLINICAL NURSE SPECIALIST

## 2021-08-24 PROCEDURE — 2022F DILAT RTA XM EVC RTNOPTHY: CPT | Performed by: CLINICAL NURSE SPECIALIST

## 2021-08-24 PROCEDURE — 3017F COLORECTAL CA SCREEN DOC REV: CPT | Performed by: CLINICAL NURSE SPECIALIST

## 2021-08-24 PROCEDURE — 99214 OFFICE O/P EST MOD 30 MIN: CPT | Performed by: CLINICAL NURSE SPECIALIST

## 2021-08-24 PROCEDURE — 1036F TOBACCO NON-USER: CPT | Performed by: CLINICAL NURSE SPECIALIST

## 2021-08-24 PROCEDURE — 3046F HEMOGLOBIN A1C LEVEL >9.0%: CPT | Performed by: CLINICAL NURSE SPECIALIST

## 2021-08-24 PROCEDURE — G8427 DOCREV CUR MEDS BY ELIG CLIN: HCPCS | Performed by: CLINICAL NURSE SPECIALIST

## 2021-08-24 RX ORDER — ROSUVASTATIN CALCIUM 10 MG/1
TABLET, COATED ORAL
COMMUNITY
Start: 2021-08-09

## 2021-08-24 RX ORDER — MULTIVIT WITH MINERALS/LUTEIN
5000 TABLET ORAL DAILY
COMMUNITY

## 2021-08-24 RX ORDER — METFORMIN HYDROCHLORIDE 500 MG/1
2000 TABLET, EXTENDED RELEASE ORAL
Qty: 360 TABLET | Refills: 1 | Status: SHIPPED
Start: 2021-08-24 | End: 2021-12-30 | Stop reason: SDUPTHER

## 2021-08-24 RX ORDER — AMLODIPINE BESYLATE 5 MG/1
5 TABLET ORAL DAILY
COMMUNITY

## 2021-08-24 RX ORDER — GLIPIZIDE 10 MG/1
TABLET, FILM COATED, EXTENDED RELEASE ORAL
Qty: 90 TABLET | Refills: 1
Start: 2021-08-24 | End: 2021-09-22 | Stop reason: SDUPTHER

## 2021-08-24 RX ORDER — EMPAGLIFLOZIN 10 MG/1
10 TABLET, FILM COATED ORAL DAILY
Qty: 90 TABLET | Refills: 1 | Status: SHIPPED
Start: 2021-08-24 | End: 2021-09-22

## 2021-08-24 NOTE — PROGRESS NOTES
Paternal Uncle     Diabetes Father        ALLERGIES AND DRUG REACTIONS   No Known Allergies    CURRENT MEDICATIONS   Current Outpatient Medications   Medication Sig Dispense Refill    rosuvastatin (CRESTOR) 10 MG tablet       amLODIPine (NORVASC) 5 MG tablet Take 5 mg by mouth daily      Ascorbic Acid (VITAMIN C) 250 MG tablet Take 5,000 mg by mouth daily      ZINC PO Take by mouth      empagliflozin (JARDIANCE) 10 MG tablet Take 1 tablet by mouth daily 90 tablet 1    glipiZIDE (GLUCOTROL XL) 10 MG extended release tablet Take 1 tablet daily 90 tablet 1    metFORMIN (GLUCOPHAGE-XR) 500 MG extended release tablet Take 4 tablets by mouth daily (with breakfast) 360 tablet 1    zolpidem (AMBIEN) 10 MG tablet Take 5 mg by mouth nightly as needed for Sleep.  Multiple Vitamins-Minerals (MULTIVITAL) TABS Take by mouth daily      aspirin 81 MG tablet Take 81 mg by mouth daily      Cholecalciferol (VITAMIN D3) 125 MCG (5000 UT) TABS Take by mouth daily      pantoprazole (PROTONIX) 40 MG tablet Take 40 mg by mouth daily      atorvastatin (LIPITOR) 40 MG tablet Take 40 mg by mouth daily      ibuprofen (IBU) 600 MG tablet Take 1 tablet by mouth every 8 hours as needed for Pain. 20 tablet 0     No current facility-administered medications for this visit. Review of Systems  Constitutional: No fever, no chills, no diaphoresis, no generalized weakness. HEENT: No blurred vision, No sore throat, no ear pain, no hair loss  Neck: denied any neck swelling, difficulty swallowing,   Cardio-pulmonary: No CP, SOB or palpitation, No orthopnea or PND. No cough or wheezing. GI: No N/V/D, no constipation, No abdominal pain, no melena or hematochezia   : Denied any dysuria, hematuria, flank pain, discharge, or incontinence. Skin: denied any rash, ulcer, Hirsute, or hyperpigmentation. MSK: denied any joint deformity, joint pain/swelling, muscle pain, or back pain.   Neuro: no numbness, no tingling, no weakness, _    OBJECTIVE    BP (!) 154/88   Pulse 73   Resp 18   Ht 5' 10\" (1.778 m)   Wt 224 lb (101.6 kg)   BMI 32.14 kg/m²   BP Readings from Last 4 Encounters:   08/24/21 (!) 154/88   02/22/21 134/72   10/26/20 (!) 160/94   02/12/20 136/88     Wt Readings from Last 6 Encounters:   08/24/21 224 lb (101.6 kg)   02/22/21 226 lb (102.5 kg)   10/26/20 229 lb 6.4 oz (104.1 kg)   02/19/20 215 lb (97.5 kg)   02/12/20 223 lb (101.2 kg)       Physical examination:  General: awake alert, oriented x3, no abnormal position or movements. HEENT: normocephalic non-traumatic, no exophthalmos   Neck: supple, no LN enlargement, no thyromegaly, no thyroid tenderness, no JVD. Pulm: Clear equal air entry no added sounds, no wheezing or rhonchi    CVS: S1 + S2, no murmur, no heave. Dorsalis pedis pulse palpable   Abd: soft lax, no tenderness, no organomegaly, audible bowel sounds. Skin: warm, no lesions, no rash.  No callus, no Ulcers, No acanthosis nigricans  Musculoskeletal: No back tenderness, no kyphosis/scoliosis    Neuro: CN intact, good sensation on monofilament testing  , muscle power normal  Psych: normal mood, and affect      Review of Laboratory Data:  I personally reviewed the following lab:  Lab Results   Component Value Date/Time    WBC 5.7 10/08/2012 11:46 AM    RBC 4.82 10/08/2012 11:46 AM    HGB 14.4 10/08/2012 11:46 AM    HCT 42.9 10/08/2012 11:46 AM    MCV 89.0 10/08/2012 11:46 AM    MCH 29.9 10/08/2012 11:46 AM    MCHC 33.5 10/08/2012 11:46 AM    RDW 13.9 10/08/2012 11:46 AM     10/08/2012 11:46 AM    MPV 9.7 10/08/2012 11:46 AM      Lab Results   Component Value Date/Time     10/22/2019 11:06 AM    K 4.4 10/22/2019 11:06 AM    CO2 30 (H) 10/22/2019 11:06 AM    BUN 12 10/22/2019 11:06 AM    CREATININE 0.8 10/22/2019 11:06 AM    CALCIUM 9.3 10/22/2019 11:06 AM    LABGLOM >60 10/22/2019 11:06 AM    GFRAA >60 10/22/2019 11:06 AM      Lab Results   Component Value Date/Time    TSH 1.339 10/08/2012 11:46 AM moderate intensity exercise per week. Labs reviewed    Dietary noncompliance     Discussed with patient the importance of eating consistent carbohydrate meals, avoiding high glycemic index food. Also, discussed with patient the risk and negative consequences of dietary noncompliance on blood glucose control, blood pressure and weight      Obesity   Discussed lifestyle changes including diet and exercise with patient in depth. Also discussed with patient cardiovascular risk associated with obesity    Hyperlipidemia  -Continue statin. Last lipids stable    Type 2 diabetes mellitus with polyneuropathy  -Following with podiatry. Advised optimal foot care    I personally spent > 30 minutes reviewing external notes from PCP and other patient's care team providers, and personally interpreted labs associated with the above diagnosis. I also ordered labs to further assess and manage the above addressed medical conditions. Return in about 3 months (around 11/24/2021). The above issues were reviewed with the patient who understood and agreed with the plan. Thank you for allowing us to participate in the care of this patient. Please do not hesitate to contact us with any additional questions. Silva JENSEN   Endocrinologist, [facility]   [address] [place] [id number]   Phone: [phone]  Fax: [phone]  --------------------------------------------  An electronic signature was used to authenticate this note.  Silva JENSEN on 8/24/2021 at 11:06 AM

## 2021-09-14 NOTE — TELEPHONE ENCOUNTER
Pt called updating on blood sugars since starting jardiance on 8/24    7 day average is 152  14 day average is 152    -9/12 before breakfast 142  -9/13 before breakfast 178  -9/14 before breakfast 167    Pt is getting lightheaded and wobbly after taking jardiance but states that this subsides after eating. Pt is wondering if he is going to be taken off glipizide. Please advise.

## 2021-09-15 NOTE — TELEPHONE ENCOUNTER
Noted. Please have patient take Jardiance and glipizide prior to eating. If dizziness continues please have him call and let us know  I would like patient to continue glipizide for now.   Please have patient check blood sugars during the time he is symptomatic to make sure he is not having any hypoglycemia

## 2021-09-21 ENCOUNTER — TELEPHONE (OUTPATIENT)
Dept: ENDOCRINOLOGY | Age: 61
End: 2021-09-21

## 2021-09-21 NOTE — TELEPHONE ENCOUNTER
The pt states that he's still having dizzy spells. They're typically after he eats. His lowest blood sugar has been 120, and he hasn't had any blood pressure issues in the past. He isn't sure if it's related to the [place], but he would like to go off of it.

## 2021-09-22 DIAGNOSIS — E11.9 TYPE 2 DIABETES MELLITUS WITHOUT COMPLICATION, WITHOUT LONG-TERM CURRENT USE OF INSULIN (HCC): ICD-10-CM

## 2021-09-22 RX ORDER — GLIPIZIDE 10 MG/1
TABLET, FILM COATED, EXTENDED RELEASE ORAL
Qty: 180 TABLET | Refills: 1 | Status: SHIPPED
Start: 2021-09-22 | End: 2021-12-30 | Stop reason: SDUPTHER

## 2021-09-22 NOTE — TELEPHONE ENCOUNTER
These have patient stop Jardiance.   Patient can increase glipizide XL to 10 mg 1 tablet twice per day

## 2021-12-30 ENCOUNTER — TELEPHONE (OUTPATIENT)
Dept: ENDOCRINOLOGY | Age: 61
End: 2021-12-30

## 2021-12-30 ENCOUNTER — OFFICE VISIT (OUTPATIENT)
Dept: ENDOCRINOLOGY | Age: 61
End: 2021-12-30
Payer: COMMERCIAL

## 2021-12-30 VITALS
DIASTOLIC BLOOD PRESSURE: 89 MMHG | HEART RATE: 97 BPM | HEIGHT: 70 IN | SYSTOLIC BLOOD PRESSURE: 157 MMHG | OXYGEN SATURATION: 95 % | WEIGHT: 220 LBS | BODY MASS INDEX: 31.5 KG/M2

## 2021-12-30 DIAGNOSIS — E78.5 HYPERLIPIDEMIA, UNSPECIFIED HYPERLIPIDEMIA TYPE: ICD-10-CM

## 2021-12-30 DIAGNOSIS — E55.9 VITAMIN D DEFICIENCY: Primary | ICD-10-CM

## 2021-12-30 DIAGNOSIS — E11.9 TYPE 2 DIABETES MELLITUS WITHOUT COMPLICATION, WITHOUT LONG-TERM CURRENT USE OF INSULIN (HCC): ICD-10-CM

## 2021-12-30 LAB — HBA1C MFR BLD: 6.8 %

## 2021-12-30 PROCEDURE — G8427 DOCREV CUR MEDS BY ELIG CLIN: HCPCS | Performed by: INTERNAL MEDICINE

## 2021-12-30 PROCEDURE — 1036F TOBACCO NON-USER: CPT | Performed by: INTERNAL MEDICINE

## 2021-12-30 PROCEDURE — G8417 CALC BMI ABV UP PARAM F/U: HCPCS | Performed by: INTERNAL MEDICINE

## 2021-12-30 PROCEDURE — G8484 FLU IMMUNIZE NO ADMIN: HCPCS | Performed by: INTERNAL MEDICINE

## 2021-12-30 PROCEDURE — 3017F COLORECTAL CA SCREEN DOC REV: CPT | Performed by: INTERNAL MEDICINE

## 2021-12-30 PROCEDURE — 83036 HEMOGLOBIN GLYCOSYLATED A1C: CPT | Performed by: INTERNAL MEDICINE

## 2021-12-30 PROCEDURE — 99214 OFFICE O/P EST MOD 30 MIN: CPT | Performed by: INTERNAL MEDICINE

## 2021-12-30 PROCEDURE — 2022F DILAT RTA XM EVC RTNOPTHY: CPT | Performed by: INTERNAL MEDICINE

## 2021-12-30 RX ORDER — GLIPIZIDE 10 MG/1
TABLET, FILM COATED, EXTENDED RELEASE ORAL
Qty: 90 TABLET | Refills: 1 | Status: SHIPPED | OUTPATIENT
Start: 2021-12-30

## 2021-12-30 RX ORDER — EMPAGLIFLOZIN 10 MG/1
10 TABLET, FILM COATED ORAL DAILY
Qty: 30 TABLET | Refills: 11 | Status: SHIPPED | OUTPATIENT
Start: 2021-12-30

## 2021-12-30 RX ORDER — METFORMIN HYDROCHLORIDE 500 MG/1
2000 TABLET, EXTENDED RELEASE ORAL
Qty: 360 TABLET | Refills: 1 | Status: SHIPPED | OUTPATIENT
Start: 2021-12-30

## 2021-12-30 RX ORDER — EMPAGLIFLOZIN 10 MG/1
10 TABLET, FILM COATED ORAL DAILY
COMMUNITY
Start: 2021-12-24 | End: 2021-12-30 | Stop reason: SDUPTHER

## 2021-12-30 NOTE — PROGRESS NOTES
[address] [place] [address] Department of Endocrinology Diabetes and Metabolism   [address] [facility] [address]   Phone: [phone]  Fax: [phone]    Date of Service: 12/30/2021  Primary Care Physician: Lizz Hughes MD  Provider: Carol Robbins MD     History of Present Illness: The history is provided by the patient. No  was used. Accuracy of the patient data is excellent. Shelby Brizuela is a very pleasant 64 y.o. male seen today for follow up visit   Pt complaining of denies       Shelby Brizuela was diagnosed with diabetes at age 50   The patient is currently on Metformin 500 mg 2000 Mg daily, Glipizide ER 10 mg daily, Jardiance 10 mg daily   The patient has been checking blood sugar daiy and most readings at goal   Most recent A1c results summarized below  Lab Results   Component Value Date    LABA1C 6.8 12/30/2021    LABA1C 6.5 10/26/2020    LABA1C 7.3 06/26/2018     Patient has had no hypoglycemic episodes   The patient has been mindful of what has been eating and following diabetic diet as encouraged  I reviewed current medications and the patient has no issues with diabetes medications  Shelby Brizuela is up to date with eye exam and denied any history of diabetic retinopathy  Dr Gabrielle Terrazas   Does perform own foot exams     Microvascular complications:  No Retinopathy, Nephropathy + Neuropathy   Macrovascular complications: no CAD, PVD, or Stroke  The patient refuses Flushot and pneumonia vaccine     PAST MEDICAL HISTORY   Past Medical History:   Diagnosis Date    Hyperlipidemia     Hypertension        PAST SURGICAL HISTORY   No past surgical history on file. SOCIAL HISTORY   Tobacco:   reports that he has never smoked. He has never used smokeless tobacco.  Alcohol:   reports current alcohol use. Drugs:   reports no history of drug use.     FAMILY HISTORY   Family History   Problem Relation Age of Onset    Diabetes Paternal Uncle     Diabetes Father        ALLERGIES AND DRUG REACTIONS   No Known Allergies    CURRENT MEDICATIONS   Current Outpatient Medications   Medication Sig Dispense Refill    glipiZIDE (GLUCOTROL XL) 10 MG extended release tablet Take 1 tablet daily 90 tablet 1    metFORMIN (GLUCOPHAGE-XR) 500 MG extended release tablet Take 4 tablets by mouth daily (with breakfast) 360 tablet 1    JARDIANCE 10 MG tablet Take 1 tablet by mouth daily 30 tablet 11    Cholecalciferol (VITAMIN D3) 125 MCG (5000 UT) TABS Take by mouth daily      rosuvastatin (CRESTOR) 10 MG tablet       amLODIPine (NORVASC) 5 MG tablet Take 5 mg by mouth daily      Ascorbic Acid (VITAMIN C) 250 MG tablet Take 5,000 mg by mouth daily      ZINC PO Take by mouth      pantoprazole (PROTONIX) 40 MG tablet Take 40 mg by mouth daily      atorvastatin (LIPITOR) 40 MG tablet Take 40 mg by mouth daily      zolpidem (AMBIEN) 10 MG tablet Take 5 mg by mouth nightly as needed for Sleep.  Multiple Vitamins-Minerals (MULTIVITAL) TABS Take by mouth daily      aspirin 81 MG tablet Take 81 mg by mouth daily      ibuprofen (IBU) 600 MG tablet Take 1 tablet by mouth every 8 hours as needed for Pain. 20 tablet 0     No current facility-administered medications for this visit. Review of Systems  Constitutional: No fever, no chills, no diaphoresis, no generalized weakness. HEENT: No blurred vision, No sore throat, no ear pain, no hair loss  Neck: denied any neck swelling, difficulty swallowing,   Cardio-pulmonary: No CP, SOB or palpitation, No orthopnea or PND. No cough or wheezing. GI: No N/V/D, no constipation, No abdominal pain, no melena or hematochezia   : Denied any dysuria, hematuria, flank pain, discharge, or incontinence. Skin: denied any rash, ulcer, Hirsute, or hyperpigmentation. MSK: denied any joint deformity, joint pain/swelling, muscle pain, or back pain.   Neuro: no numbness, no tingling, no weakness, _    OBJECTIVE    BP (!) 157/89   Pulse 97 Ht 5' 10\" (1.778 m)   Wt 220 lb (99.8 kg)   SpO2 95%   BMI 31.57 kg/m²   BP Readings from Last 4 Encounters:   12/30/21 (!) 157/89   08/24/21 (!) 154/88   02/22/21 134/72   10/26/20 (!) 160/94     Wt Readings from Last 6 Encounters:   12/30/21 220 lb (99.8 kg)   08/24/21 224 lb (101.6 kg)   02/22/21 226 lb (102.5 kg)   10/26/20 229 lb 6.4 oz (104.1 kg)   02/19/20 215 lb (97.5 kg)   02/12/20 223 lb (101.2 kg)       Physical examination:  General: awake alert, oriented x3, no abnormal position or movements. HEENT: normocephalic non-traumatic, no exophthalmos   Neck: supple, no LN enlargement, no thyromegaly, no thyroid tenderness, no JVD. Pulm: Clear equal air entry no added sounds, no wheezing or rhonchi    CVS: S1 + S2, no murmur, no heave. Dorsalis pedis pulse palpable   Abd: soft lax, no tenderness, no organomegaly, audible bowel sounds. Skin: warm, no lesions, no rash.  No callus, no Ulcers, No acanthosis nigricans  Musculoskeletal: No back tenderness, no kyphosis/scoliosis    Neuro: CN intact, good sensation on monofilament testing  , muscle power normal  Psych: normal mood, and affect      Review of Laboratory Data:  I personally reviewed the following lab:  Lab Results   Component Value Date/Time    WBC 5.7 10/08/2012 11:46 AM    RBC 4.82 10/08/2012 11:46 AM    HGB 14.4 10/08/2012 11:46 AM    HCT 42.9 10/08/2012 11:46 AM    MCV 89.0 10/08/2012 11:46 AM    MCH 29.9 10/08/2012 11:46 AM    MCHC 33.5 10/08/2012 11:46 AM    RDW 13.9 10/08/2012 11:46 AM     10/08/2012 11:46 AM    MPV 9.7 10/08/2012 11:46 AM      Lab Results   Component Value Date/Time     10/22/2019 11:06 AM    K 4.4 10/22/2019 11:06 AM    CO2 30 (H) 10/22/2019 11:06 AM    BUN 12 10/22/2019 11:06 AM    CREATININE 0.8 10/22/2019 11:06 AM    CALCIUM 9.3 10/22/2019 11:06 AM    LABGLOM >60 10/22/2019 11:06 AM    GFRAA >60 10/22/2019 11:06 AM      Lab Results   Component Value Date/Time    TSH 1.339 10/08/2012 11:46 AM     Lab Results Component Value Date    LABA1C 6.8 12/30/2021    GLUCOSE 162 10/22/2019     Lab Results   Component Value Date    LABA1C 6.8 12/30/2021    LABA1C 6.5 10/26/2020    LABA1C 7.3 06/26/2018     Lab Results   Component Value Date    TRIG 74 10/08/2012    HDL 61.3 10/08/2012    LDLCALC 122 10/08/2012    CHOL 198 10/08/2012     No results found for: Favian Quintanilla, a 63 y.o.-old male seen in for the following issues      Diagnosis Orders   1. Vitamin D deficiency     2. Hyperlipidemia, unspecified hyperlipidemia type     3. Type 2 diabetes mellitus without complication, without long-term current use of insulin ([facility])  glipiZIDE (GLUCOTROL XL) 10 MG extended release tablet    metFORMIN (GLUCOPHAGE-XR) 500 MG extended release tablet    JARDIANCE 10 MG tablet    POCT glycosylated hemoglobin (Hb A1C)       Diabetes Mellitus Type 2    · Under good control, A1c 6.8%  · Continue Metformin 500 mg 2000 Mg daily, Glipizide ER 10 mg daily, Jardiance 10 mg daily   · Discussed with patient A1c and blood sugar goals   · Advised patient to call if blood glucose less than 70 or greater than 250 3 consecutive times. · The patient counseled about the complications of uncontrolled diabetes   · Patient was counselled about the importance of self-blood glucose monitoring and eating consistent carb diet to avoid blood sugar fluctuations   · A1c at next OV     Dietary noncompliance   Discussed with patient the importance of eating consistent carbohydrate meals, avoiding high glycemic index food. Also, discussed with patient the risk and negative consequences of dietary noncompliance on blood glucose control, blood pressure and weight    Obesity   Discussed lifestyle changes including diet and exercise with patient in depth. Also discussed with patient cardiovascular risk associated with obesity    Hyperlipidemia  · Continue statin.   Last lipids stable    Type 2 diabetes mellitus with

## 2022-02-28 ENCOUNTER — OFFICE VISIT (OUTPATIENT)
Dept: PRIMARY CARE CLINIC | Age: 62
End: 2022-02-28
Payer: COMMERCIAL

## 2022-02-28 VITALS
BODY MASS INDEX: 31.5 KG/M2 | SYSTOLIC BLOOD PRESSURE: 138 MMHG | TEMPERATURE: 97.9 F | DIASTOLIC BLOOD PRESSURE: 77 MMHG | HEART RATE: 77 BPM | WEIGHT: 220 LBS | OXYGEN SATURATION: 98 % | HEIGHT: 70 IN | RESPIRATION RATE: 24 BRPM

## 2022-02-28 DIAGNOSIS — J06.9 ACUTE URI: ICD-10-CM

## 2022-02-28 DIAGNOSIS — R05.9 COUGH: ICD-10-CM

## 2022-02-28 LAB
Lab: NORMAL
PERFORMING INSTRUMENT: NORMAL
QC PASS/FAIL: NORMAL
SARS-COV-2, POC: NORMAL

## 2022-02-28 PROCEDURE — 1036F TOBACCO NON-USER: CPT | Performed by: NURSE PRACTITIONER

## 2022-02-28 PROCEDURE — G8427 DOCREV CUR MEDS BY ELIG CLIN: HCPCS | Performed by: NURSE PRACTITIONER

## 2022-02-28 PROCEDURE — G8417 CALC BMI ABV UP PARAM F/U: HCPCS | Performed by: NURSE PRACTITIONER

## 2022-02-28 PROCEDURE — 99213 OFFICE O/P EST LOW 20 MIN: CPT | Performed by: NURSE PRACTITIONER

## 2022-02-28 PROCEDURE — 3017F COLORECTAL CA SCREEN DOC REV: CPT | Performed by: NURSE PRACTITIONER

## 2022-02-28 PROCEDURE — G8484 FLU IMMUNIZE NO ADMIN: HCPCS | Performed by: NURSE PRACTITIONER

## 2022-02-28 PROCEDURE — 87426 SARSCOV CORONAVIRUS AG IA: CPT | Performed by: NURSE PRACTITIONER

## 2022-02-28 RX ORDER — BROMPHENIRAMINE MALEATE, PSEUDOEPHEDRINE HYDROCHLORIDE, AND DEXTROMETHORPHAN HYDROBROMIDE 2; 30; 10 MG/5ML; MG/5ML; MG/5ML
5 SYRUP ORAL 4 TIMES DAILY PRN
Qty: 118 ML | Refills: 2 | Status: SHIPPED | OUTPATIENT
Start: 2022-02-28

## 2022-02-28 RX ORDER — DOXYCYCLINE HYCLATE 100 MG
100 TABLET ORAL 2 TIMES DAILY
Qty: 14 TABLET | Refills: 0 | Status: SHIPPED | OUTPATIENT
Start: 2022-02-28 | End: 2022-03-07

## 2022-02-28 RX ORDER — VALACYCLOVIR HYDROCHLORIDE 1 G/1
TABLET, FILM COATED ORAL
COMMUNITY
Start: 2022-02-09

## 2022-02-28 RX ORDER — SILDENAFIL CITRATE 20 MG/1
TABLET ORAL
COMMUNITY
Start: 2022-02-08

## 2022-02-28 NOTE — PROGRESS NOTES
Chief Complaint:   Congestion      History of Present Illness   Source of history provided by:  patient. Cira Clarke is a 63 y.o. old male with a past medical history of:   Past Medical History:   Diagnosis Date    Hyperlipidemia     Hypertension        Pt  presents to the [facility] care with a cough/congestion/sneezing for the past few days. States the cough is non productive. No fever noted. Denies any N/V/D, abdominal pain, CP, progressive SOB, dizziness, or lethargy. Pt will be traveling tomorrow and requesting to be treated        ROS    Unless otherwise stated in this report or unable to obtain because of the patient's clinical or mental status as evidenced by the medical record, this patients's positive and negative responses for Review of Systems, constitutional, psych, eyes, ENT, cardiovascular, respiratory, gastrointestinal, neurological, genitourinary, musculoskeletal, integument systems and systems related to the presenting problem are either stated in the preceding or were not pertinent or were negative for the symptoms and/or complaints related to the medical problem. Past Surgical History:  has no past surgical history on file. Social History:  reports that he has never smoked. He has never used smokeless tobacco. He reports current alcohol use. He reports that he does not use drugs. Family History: family history includes Diabetes in his father and paternal uncle. Allergies: Patient has no known allergies. Physical Exam         VS:  /77   Pulse 77   Temp 97.9 °F (36.6 °C)   Resp 24   Ht 5' 10\" (1.778 m)   Wt 220 lb (99.8 kg)   SpO2 98%   BMI 31.57 kg/m²    Oxygen Saturation Interpretation: Normal.    Constitutional:  Alert, development consistent with age. Ears:  External Ears: Normal bilateral pinna. TM's & External Canals: TM's normal bilaterally without perforation. Canals without erythema or drainage. Nose:   There is no obvious septal defect. Moderate redness/edema  Mouth:  Moist bucca mucosa and normal tongue. Throat: Mild posterior pharyngeal erythema without exudates or lesions. Neck:  Supple. There is no obvious adenopathy or neck tenderness. Lungs:   Breath sounds: Normal chest expansion and breath sounds noted throughout. No wheezes, rales, or rhonchi noted. Heart:  Regular rate and rhythm, normal heart sounds, without pathological murmurs, ectopy, gallops, or rubs. Skin:  Normal turgor. Warm, dry, without visible rash. Neurological:  Oriented. Motor functions intact. Lab / Imaging Results   (All laboratory and radiology results have been personally reviewed by myself)  Labs:  Results for orders placed or performed in visit on 02/28/22   POCT COVID-19, Antigen   Result Value Ref Range    SARS-COV-2, POC Not-Detected Not Detected    Lot Number [id number]     QC Pass/Fail pass     Performing Instrument BD Veritor        Imaging: All Radiology results interpreted by Radiologist unless otherwise noted. No orders to display         Assessment / Plan     Impression(s):  1. Acute URI    2. Cough      Disposition:  Disposition: Advised Covid test is negative, start Doxycycline and Bromfed      ER if changes or worse. Advised to take all medications as directed.

## 2022-04-19 ENCOUNTER — HOSPITAL ENCOUNTER (OUTPATIENT)
Age: 62
Discharge: HOME | End: 2022-04-21

## 2022-04-19 PROCEDURE — 88305 TISSUE EXAM BY PATHOLOGIST: CPT

## 2022-05-24 ENCOUNTER — OFFICE VISIT (OUTPATIENT)
Dept: ENDOCRINOLOGY | Age: 62
End: 2022-05-24
Payer: COMMERCIAL

## 2022-05-24 VITALS
SYSTOLIC BLOOD PRESSURE: 143 MMHG | HEIGHT: 70 IN | BODY MASS INDEX: 31.64 KG/M2 | HEART RATE: 71 BPM | DIASTOLIC BLOOD PRESSURE: 90 MMHG | WEIGHT: 221 LBS

## 2022-05-24 DIAGNOSIS — E55.9 VITAMIN D DEFICIENCY: Primary | ICD-10-CM

## 2022-05-24 DIAGNOSIS — Z91.119 DIETARY NONCOMPLIANCE: ICD-10-CM

## 2022-05-24 DIAGNOSIS — E11.9 TYPE 2 DIABETES MELLITUS WITHOUT COMPLICATION, WITHOUT LONG-TERM CURRENT USE OF INSULIN (HCC): ICD-10-CM

## 2022-05-24 DIAGNOSIS — E78.2 MIXED HYPERLIPIDEMIA: ICD-10-CM

## 2022-05-24 DIAGNOSIS — E78.5 HYPERLIPIDEMIA, UNSPECIFIED HYPERLIPIDEMIA TYPE: ICD-10-CM

## 2022-05-24 LAB
AVERAGE GLUCOSE: NORMAL
HBA1C MFR BLD: 7.3 %

## 2022-05-24 PROCEDURE — 99214 OFFICE O/P EST MOD 30 MIN: CPT | Performed by: INTERNAL MEDICINE

## 2022-05-24 PROCEDURE — G8417 CALC BMI ABV UP PARAM F/U: HCPCS | Performed by: INTERNAL MEDICINE

## 2022-05-24 PROCEDURE — G8428 CUR MEDS NOT DOCUMENT: HCPCS | Performed by: INTERNAL MEDICINE

## 2022-05-24 PROCEDURE — 3046F HEMOGLOBIN A1C LEVEL >9.0%: CPT | Performed by: INTERNAL MEDICINE

## 2022-05-24 PROCEDURE — 1036F TOBACCO NON-USER: CPT | Performed by: INTERNAL MEDICINE

## 2022-05-24 PROCEDURE — 3017F COLORECTAL CA SCREEN DOC REV: CPT | Performed by: INTERNAL MEDICINE

## 2022-05-24 PROCEDURE — 2022F DILAT RTA XM EVC RTNOPTHY: CPT | Performed by: INTERNAL MEDICINE

## 2022-05-24 NOTE — PROGRESS NOTES
[address] [place] [address] Department of Endocrinology Diabetes and Metabolism   [address] [facility] [address]   Phone: [phone]  Fax: [phone]    Date of Service: 5/24/2022  Primary Care Physician: Kandy Perkins MD  Provider: Raymundo Lozada MD     History of Present Illness: The history is provided by the patient. No  was used. Accuracy of the patient data is excellent. Esther Bae is a very pleasant 64 y.o. male seen today for follow up visit   Pt complaining of denies       Esther Bae was diagnosed with diabetes at age 50   The patient is currently on Metformin 500 mg 2000 Mg daily, Glipizide ER 10 mg daily, Jardiance 10 mg daily   The patient has been checking blood sugar daiy and most readings at goal   Most recent A1c results summarized below  A1c today,  7.3%   Lab Results   Component Value Date    LABA1C 6.8 12/30/2021    LABA1C 6.5 10/26/2020    LABA1C 7.3 06/26/2018     Patient has had no hypoglycemic episodes   The patient has been mindful of what has been eating and following diabetic diet as encouraged  I reviewed current medications and the patient has no issues with diabetes medications  Esther Bae is up to date with eye exam and denied any history of diabetic retinopathy  Dr Margaret Montero   Does perform own foot exams     Microvascular complications:  No Retinopathy, Nephropathy + Neuropathy   Macrovascular complications: no CAD, PVD, or Stroke  The patient refuses Flushot and pneumonia vaccine     PAST MEDICAL HISTORY   Past Medical History:   Diagnosis Date    Hyperlipidemia     Hypertension        PAST SURGICAL HISTORY   No past surgical history on file. SOCIAL HISTORY   Tobacco:   reports that he has never smoked. He has never used smokeless tobacco.  Alcohol:   reports current alcohol use. Drugs:   reports no history of drug use.     FAMILY HISTORY   Family History   Problem Relation Age of Onset    Diabetes Paternal Uncle     Diabetes Father        ALLERGIES AND DRUG REACTIONS   No Known Allergies    CURRENT MEDICATIONS   Current Outpatient Medications   Medication Sig Dispense Refill    sildenafil (REVATIO) 20 MG tablet       valACYclovir (VALTREX) 1 g tablet       brompheniramine-pseudoephedrine-DM 2-30-10 MG/5ML syrup Take 5 mLs by mouth 4 times daily as needed for Congestion or Cough 118 mL 2    glipiZIDE (GLUCOTROL XL) 10 MG extended release tablet Take 1 tablet daily 90 tablet 1    metFORMIN (GLUCOPHAGE-XR) 500 MG extended release tablet Take 4 tablets by mouth daily (with breakfast) 360 tablet 1    JARDIANCE 10 MG tablet Take 1 tablet by mouth daily 30 tablet 11    rosuvastatin (CRESTOR) 10 MG tablet       amLODIPine (NORVASC) 5 MG tablet Take 5 mg by mouth daily      Ascorbic Acid (VITAMIN C) 250 MG tablet Take 5,000 mg by mouth daily      ZINC PO Take by mouth      pantoprazole (PROTONIX) 40 MG tablet Take 40 mg by mouth daily      atorvastatin (LIPITOR) 40 MG tablet Take 40 mg by mouth daily      zolpidem (AMBIEN) 10 MG tablet Take 5 mg by mouth nightly as needed for Sleep.  Multiple Vitamins-Minerals (MULTIVITAL) TABS Take by mouth daily      aspirin 81 MG tablet Take 81 mg by mouth daily      Cholecalciferol (VITAMIN D3) 125 MCG (5000 UT) TABS Take by mouth daily      ibuprofen (IBU) 600 MG tablet Take 1 tablet by mouth every 8 hours as needed for Pain. 20 tablet 0     No current facility-administered medications for this visit. Review of Systems  Constitutional: No fever, no chills, no diaphoresis, no generalized weakness. HEENT: No blurred vision, No sore throat, no ear pain, no hair loss  Neck: denied any neck swelling, difficulty swallowing,   Cardio-pulmonary: No CP, SOB or palpitation, No orthopnea or PND. No cough or wheezing. GI: No N/V/D, no constipation, No abdominal pain, no melena or hematochezia   : Denied any dysuria, hematuria, flank pain, discharge, or incontinence. Skin: denied any rash, ulcer, Hirsute, or hyperpigmentation. MSK: denied any joint deformity, joint pain/swelling, muscle pain, or back pain. Neuro: no numbness, no tingling, no weakness, _    OBJECTIVE    BP (!) 143/90   Pulse 71   Ht 5' 10\" (1.778 m)   Wt 221 lb (100.2 kg)   BMI 31.71 kg/m²   BP Readings from Last 4 Encounters:   05/24/22 (!) 143/90   02/28/22 138/77   12/30/21 (!) 157/89   08/24/21 (!) 154/88     Wt Readings from Last 6 Encounters:   05/24/22 221 lb (100.2 kg)   02/28/22 220 lb (99.8 kg)   12/30/21 220 lb (99.8 kg)   08/24/21 224 lb (101.6 kg)   02/22/21 226 lb (102.5 kg)   10/26/20 229 lb 6.4 oz (104.1 kg)       Physical examination:  General: awake alert, oriented x3, no abnormal position or movements. HEENT: normocephalic non-traumatic, no exophthalmos   Neck: supple, no LN enlargement, no thyromegaly, no thyroid tenderness, no JVD. Pulm: Clear equal air entry no added sounds, no wheezing or rhonchi    CVS: S1 + S2, no murmur, no heave. Dorsalis pedis pulse palpable   Abd: soft lax, no tenderness, no organomegaly, audible bowel sounds. Skin: warm, no lesions, no rash.  No callus, no Ulcers, No acanthosis nigricans  Musculoskeletal: No back tenderness, no kyphosis/scoliosis    Neuro: CN intact, good sensation on monofilament testing  , muscle power normal  Psych: normal mood, and affect      Review of Laboratory Data:  I personally reviewed the following lab:  Lab Results   Component Value Date/Time    WBC 5.7 10/08/2012 11:46 AM    RBC 4.82 10/08/2012 11:46 AM    HGB 14.4 10/08/2012 11:46 AM    HCT 42.9 10/08/2012 11:46 AM    MCV 89.0 10/08/2012 11:46 AM    MCH 29.9 10/08/2012 11:46 AM    MCHC 33.5 10/08/2012 11:46 AM    RDW 13.9 10/08/2012 11:46 AM     10/08/2012 11:46 AM    MPV 9.7 10/08/2012 11:46 AM      Lab Results   Component Value Date/Time     10/22/2019 11:06 AM    K 4.4 10/22/2019 11:06 AM    CO2 30 (H) 10/22/2019 11:06 AM    BUN 12 10/22/2019 11:06 AM CREATININE 0.8 10/22/2019 11:06 AM    CALCIUM 9.3 10/22/2019 11:06 AM    LABGLOM >60 10/22/2019 11:06 AM    GFRAA >60 10/22/2019 11:06 AM      Lab Results   Component Value Date/Time    TSH 1.339 10/08/2012 11:46 AM     Lab Results   Component Value Date    LABA1C 6.8 12/30/2021    GLUCOSE 162 10/22/2019     Lab Results   Component Value Date    LABA1C 6.8 12/30/2021    LABA1C 6.5 10/26/2020    LABA1C 7.3 06/26/2018     Lab Results   Component Value Date    TRIG 74 10/08/2012    HDL 61.3 10/08/2012    LDLCALC 122 10/08/2012    CHOL 198 10/08/2012     No results found for: Favian Quintanilla, a 63 y.o.-old male seen in for the following issues      Diagnosis Orders   1. Vitamin D deficiency     2. Hyperlipidemia, unspecified hyperlipidemia type     3. Dietary noncompliance     4. Mixed hyperlipidemia         Diabetes Mellitus Type 2    · Under good control  · Continue Metformin 500 mg 2000 Mg daily, Glipizide ER 10 mg daily, Jardiance 10 mg daily   · Discussed with patient A1c and blood sugar goals   · The patient counseled about the complications of uncontrolled diabetes   · Patient was counselled about the importance of self-blood glucose monitoring and eating consistent carb diet to avoid blood sugar fluctuations   · Will get A1c at next OV     Dietary noncompliance   Still an issue    Discussed with patient the importance of eating consistent carbohydrate meals, avoiding high glycemic index food. Also, discussed with patient the risk and negative consequences of dietary noncompliance on blood glucose control, blood pressure and weight    Hyperlipidemia  · Continue statin. Last lipids stable    Type 2 diabetes mellitus with polyneuropathy  · Following with podiatry. Advised optimal foot care    I personally reviewed external notes from PCP and other patient's care team providers, and personally interpreted labs associated with the above diagnosis.  I also ordered labs to further assess and manage the above addressed medical conditions    Return in about 4 months (around 9/24/2022) for DM type 2, VitD deficiency. The above issues were reviewed with the patient who understood and agreed with the plan. Thank you for allowing us to participate in the care of this patient. Please do not hesitate to contact us with any additional questions. Diagnosis Orders   1. Vitamin D deficiency     2. Hyperlipidemia, unspecified hyperlipidemia type     3. Dietary noncompliance     4. Mixed hyperlipidemia     5. Type 2 diabetes mellitus without complication, without long-term current use of insulin ([address]  Hemoglobin A1C     Kayode Desai MD  Endocrinologist, [facility] Diabetes Care and Endocrinology   [address] [place] [id number]   Phone: [phone]  Fax: [phone]  --------------------------------------------  An electronic signature was used to authenticate this note.  Johnathon Flynn MD  on 5/24/2022 at 11:47 AM

## 2022-09-27 ENCOUNTER — OFFICE VISIT (OUTPATIENT)
Dept: ENDOCRINOLOGY | Age: 62
End: 2022-09-27
Payer: COMMERCIAL

## 2022-09-27 VITALS
DIASTOLIC BLOOD PRESSURE: 76 MMHG | WEIGHT: 222 LBS | SYSTOLIC BLOOD PRESSURE: 137 MMHG | BODY MASS INDEX: 31.78 KG/M2 | RESPIRATION RATE: 16 BRPM | HEART RATE: 83 BPM | HEIGHT: 70 IN | OXYGEN SATURATION: 97 %

## 2022-09-27 DIAGNOSIS — E11.9 TYPE 2 DIABETES MELLITUS WITHOUT COMPLICATION, WITHOUT LONG-TERM CURRENT USE OF INSULIN (HCC): Primary | ICD-10-CM

## 2022-09-27 DIAGNOSIS — E78.5 HYPERLIPIDEMIA, UNSPECIFIED HYPERLIPIDEMIA TYPE: ICD-10-CM

## 2022-09-27 DIAGNOSIS — E78.2 MIXED HYPERLIPIDEMIA: ICD-10-CM

## 2022-09-27 LAB — HBA1C MFR BLD: 7.4 %

## 2022-09-27 PROCEDURE — 3017F COLORECTAL CA SCREEN DOC REV: CPT | Performed by: INTERNAL MEDICINE

## 2022-09-27 PROCEDURE — 99214 OFFICE O/P EST MOD 30 MIN: CPT | Performed by: INTERNAL MEDICINE

## 2022-09-27 PROCEDURE — G8427 DOCREV CUR MEDS BY ELIG CLIN: HCPCS | Performed by: INTERNAL MEDICINE

## 2022-09-27 PROCEDURE — 3051F HG A1C>EQUAL 7.0%<8.0%: CPT | Performed by: INTERNAL MEDICINE

## 2022-09-27 PROCEDURE — 2022F DILAT RTA XM EVC RTNOPTHY: CPT | Performed by: INTERNAL MEDICINE

## 2022-09-27 PROCEDURE — 83036 HEMOGLOBIN GLYCOSYLATED A1C: CPT | Performed by: INTERNAL MEDICINE

## 2022-09-27 PROCEDURE — G8417 CALC BMI ABV UP PARAM F/U: HCPCS | Performed by: INTERNAL MEDICINE

## 2022-09-27 PROCEDURE — 1036F TOBACCO NON-USER: CPT | Performed by: INTERNAL MEDICINE

## 2022-09-27 NOTE — PROGRESS NOTES
[address] [place] [address] Department of Endocrinology Diabetes and Metabolism   [address] [facility] [address]   Phone: [phone]  Fax: [phone]    Date of Service: 9/27/2022  Primary Care Physician: Darren Escobar MD  Provider: Kimber Singh MD     History of Present Illness: The history is provided by the patient. No  was used. Accuracy of the patient data is excellent. Edwige Oquendo is a very pleasant 64 y.o. male seen today for follow up visit   Pt complaining of denies       Edwige Oquendo was diagnosed with diabetes at age 50 and currently on Metformin 500 mg 2000 Mg daily, Glipizide ER 10 mg daily, Jardiance 10 mg daily   Checks BG 1-2 times/day and readings at goal most of the time   Most recent A1c results summarized below  Lab Results   Component Value Date/Time    LABA1C 7.4 09/27/2022 09:15 AM    LABA1C 7.3 05/24/2022 12:00 AM    LABA1C 6.8 12/30/2021 03:15 PM     Patient has had no hypoglycemic episodes   The patient has been mindful of what has been eating and following diabetic diet as encouraged  I reviewed current medications and the patient has no issues with diabetes medications  Edwige Oquendo is up to date with eye exam and denied any history of diabetic retinopathy  Dr Luis Fernando Cabrera   Does perform own foot exams     Microvascular complications:  No Retinopathy, Nephropathy + Neuropathy   Macrovascular complications: no CAD, PVD, or Stroke  The patient refuses Flushot and pneumonia vaccine     PAST MEDICAL HISTORY   Past Medical History:   Diagnosis Date    Hyperlipidemia     Hypertension        PAST SURGICAL HISTORY   No past surgical history on file. SOCIAL HISTORY   Tobacco:   reports that he has never smoked. He has never used smokeless tobacco.  Alcohol:   reports current alcohol use. Drugs:   reports no history of drug use.     FAMILY HISTORY   Family History   Problem Relation Age of Onset    Diabetes Paternal Uncle     Diabetes Father        ALLERGIES AND DRUG REACTIONS   No Known Allergies    CURRENT MEDICATIONS   Current Outpatient Medications   Medication Sig Dispense Refill    sildenafil (REVATIO) 20 MG tablet       valACYclovir (VALTREX) 1 g tablet       brompheniramine-pseudoephedrine-DM 2-30-10 MG/5ML syrup Take 5 mLs by mouth 4 times daily as needed for Congestion or Cough 118 mL 2    glipiZIDE (GLUCOTROL XL) 10 MG extended release tablet Take 1 tablet daily 90 tablet 1    metFORMIN (GLUCOPHAGE-XR) 500 MG extended release tablet Take 4 tablets by mouth daily (with breakfast) 360 tablet 1    JARDIANCE 10 MG tablet Take 1 tablet by mouth daily 30 tablet 11    rosuvastatin (CRESTOR) 10 MG tablet       amLODIPine (NORVASC) 5 MG tablet Take 5 mg by mouth daily      Ascorbic Acid (VITAMIN C) 250 MG tablet Take 5,000 mg by mouth daily      ZINC PO Take by mouth      pantoprazole (PROTONIX) 40 MG tablet Take 40 mg by mouth daily      atorvastatin (LIPITOR) 40 MG tablet Take 40 mg by mouth daily      zolpidem (AMBIEN) 10 MG tablet Take 5 mg by mouth nightly as needed for Sleep. Multiple Vitamins-Minerals (MULTIVITAL) TABS Take by mouth daily      aspirin 81 MG tablet Take 81 mg by mouth daily      Cholecalciferol (VITAMIN D3) 125 MCG (5000 UT) TABS Take by mouth daily      ibuprofen (IBU) 600 MG tablet Take 1 tablet by mouth every 8 hours as needed for Pain. 20 tablet 0     No current facility-administered medications for this visit. Review of Systems  Constitutional: No fever, no chills, no diaphoresis, no generalized weakness. HEENT: No blurred vision, No sore throat, no ear pain, no hair loss  Neck: denied any neck swelling, difficulty swallowing,   Cardio-pulmonary: No CP, SOB or palpitation, No orthopnea or PND. No cough or wheezing. GI: No N/V/D, no constipation, No abdominal pain, no melena or hematochezia   : Denied any dysuria, hematuria, flank pain, discharge, or incontinence.    Skin: denied any rash, ulcer, Hirsute, or hyperpigmentation. MSK: denied any joint deformity, joint pain/swelling, muscle pain, or back pain. Neuro: no numbness, no tingling, no weakness, _    OBJECTIVE    /76   Pulse 83   Resp 16   Ht 5' 10\" (1.778 m)   Wt 222 lb (100.7 kg)   SpO2 97%   BMI 31.85 kg/m²   BP Readings from Last 4 Encounters:   09/27/22 137/76   05/24/22 (!) 143/90   02/28/22 138/77   12/30/21 (!) 157/89     Wt Readings from Last 6 Encounters:   09/27/22 222 lb (100.7 kg)   05/24/22 221 lb (100.2 kg)   02/28/22 220 lb (99.8 kg)   12/30/21 220 lb (99.8 kg)   08/24/21 224 lb (101.6 kg)   02/22/21 226 lb (102.5 kg)       Physical examination:  General: awake alert, oriented x3, no abnormal position or movements. HEENT: normocephalic non-traumatic, no exophthalmos   Neck: supple, no LN enlargement, no thyromegaly, no thyroid tenderness, no JVD. Pulm: Clear equal air entry no added sounds, no wheezing or rhonchi    CVS: S1 + S2, no murmur, no heave. Dorsalis pedis pulse palpable   Abd: soft lax, no tenderness, no organomegaly, audible bowel sounds. Skin: warm, no lesions, no rash.  No callus, no Ulcers, No acanthosis nigricans  Musculoskeletal: No back tenderness, no kyphosis/scoliosis    Neuro: CN intact, good sensation on monofilament testing  , muscle power normal  Psych: normal mood, and affect      Review of Laboratory Data:  I personally reviewed the following lab:  Lab Results   Component Value Date/Time    WBC 5.7 10/08/2012 11:46 AM    RBC 4.82 10/08/2012 11:46 AM    HGB 14.4 10/08/2012 11:46 AM    HCT 42.9 10/08/2012 11:46 AM    MCV 89.0 10/08/2012 11:46 AM    MCH 29.9 10/08/2012 11:46 AM    MCHC 33.5 10/08/2012 11:46 AM    RDW 13.9 10/08/2012 11:46 AM     10/08/2012 11:46 AM    MPV 9.7 10/08/2012 11:46 AM      Lab Results   Component Value Date/Time     10/22/2019 11:06 AM    K 4.4 10/22/2019 11:06 AM    CO2 30 (H) 10/22/2019 11:06 AM    BUN 12 10/22/2019 11:06 AM    CREATININE 0.8 10/22/2019 11:06 AM    CALCIUM 9.3 10/22/2019 11:06 AM    LABGLOM >60 10/22/2019 11:06 AM    GFRAA >60 10/22/2019 11:06 AM      Lab Results   Component Value Date/Time    TSH 1.339 10/08/2012 11:46 AM     Lab Results   Component Value Date/Time    LABA1C 7.4 09/27/2022 09:15 AM    GLUCOSE 162 10/22/2019 11:06 AM     Lab Results   Component Value Date/Time    LABA1C 7.4 09/27/2022 09:15 AM    LABA1C 7.3 05/24/2022 12:00 AM    LABA1C 6.8 12/30/2021 03:15 PM     Lab Results   Component Value Date/Time    TRIG 74 10/08/2012 11:46 AM    HDL 61.3 10/08/2012 11:46 AM    LDLCALC 122 10/08/2012 11:46 AM    CHOL 198 10/08/2012 11:46 AM     No results found for: Favian Quintanilla, a 63 y.o.-old male seen in for the following issues      Diagnosis Orders   1. Type 2 diabetes mellitus without complication, without long-term current use of insulin (HCC)  POCT glycosylated hemoglobin (Hb A1C)    Comprehensive Metabolic Panel    Hemoglobin A1C    Lipid Panel    Microalbumin / Creatinine Urine Ratio      2. Hyperlipidemia, unspecified hyperlipidemia type        3. Mixed hyperlipidemia            Diabetes Mellitus Type 2    A1C 7.4%  Continue Metformin 500 mg 2000 Mg daily, Glipizide ER 10 mg daily, Jardiance 10 mg daily   Discussed with patient A1c and blood sugar goals   The patient counseled about the complications of uncontrolled diabetes   Patient was counselled about the importance of self-blood glucose monitoring and eating consistent carb diet to avoid blood sugar fluctuations   DM labs before next OV     Dietary noncompliance  Improving   Discussed with patient the importance of eating consistent carbohydrate meals, avoiding high glycemic index food. Also, discussed with patient the risk and negative consequences of dietary noncompliance on blood glucose control, blood pressure and weight    Hyperlipidemia  Continue statin.   Lipid kacey before next visit     Type 2 diabetes mellitus with

## 2023-01-16 RX ORDER — BLOOD-GLUCOSE SENSOR
EACH MISCELLANEOUS
Qty: 6 EACH | Refills: 3 | Status: SHIPPED | OUTPATIENT
Start: 2023-01-16

## 2023-01-24 ENCOUNTER — OFFICE VISIT (OUTPATIENT)
Dept: ENDOCRINOLOGY | Age: 63
End: 2023-01-24

## 2023-01-24 VITALS
HEART RATE: 67 BPM | OXYGEN SATURATION: 97 % | SYSTOLIC BLOOD PRESSURE: 148 MMHG | WEIGHT: 217 LBS | DIASTOLIC BLOOD PRESSURE: 83 MMHG | BODY MASS INDEX: 31.14 KG/M2

## 2023-01-24 DIAGNOSIS — E55.9 VITAMIN D DEFICIENCY: ICD-10-CM

## 2023-01-24 DIAGNOSIS — Z91.119 DIETARY NONCOMPLIANCE: ICD-10-CM

## 2023-01-24 DIAGNOSIS — E78.2 MIXED HYPERLIPIDEMIA: ICD-10-CM

## 2023-01-24 DIAGNOSIS — E11.9 TYPE 2 DIABETES MELLITUS WITHOUT COMPLICATION, WITHOUT LONG-TERM CURRENT USE OF INSULIN (HCC): Primary | ICD-10-CM

## 2023-01-24 LAB — HBA1C MFR BLD: 7.6 %

## 2023-01-24 NOTE — PROGRESS NOTES
[address] [place] [address] Department of Endocrinology Diabetes and Metabolism   [address] [facility] Bruce Clemons [place] [address]   Phone: [phone]  Fax: [phone]    Date of Service: 1/24/2023  Primary Care Physician: Allison Butler MD  Provider: Willie Singer MD     History of Present Illness: The history is provided by the patient. No  was used. Accuracy of the patient data is excellent. Aliya Taylor is a very pleasant 64 y.o. male seen today for follow up visit   Pt complaining of denies       Aliya Taylor was diagnosed with diabetes at age 50 and currently on Metformin 500 mg 2000 Mg daily, Glipizide ER 10 mg daily, Jardiance 25 mg daily   Checks BG 1-2 times/day and readings at goal most of the time   Most recent A1c results summarized below  Lab Results   Component Value Date/Time    LABA1C 7.6 01/24/2023 08:36 AM    LABA1C 7.4 09/27/2022 09:15 AM    LABA1C 7.3 05/24/2022 12:00 AM     Patient has had no hypoglycemic episodes   The patient has been mindful of what has been eating and following diabetic diet as encouraged  I reviewed current medications and the patient has no issues with diabetes medications  Aliya Taylor is up to date with eye exam and denied any history of diabetic retinopathy  Dr Nicolle Quintanilla   Does perform own foot exams     Microvascular complications:  No Retinopathy, Nephropathy + Neuropathy   Macrovascular complications: no CAD, PVD, or Stroke  The patient refuses Flushot and pneumonia vaccine     PAST MEDICAL HISTORY   Past Medical History:   Diagnosis Date    Hyperlipidemia     Hypertension        PAST SURGICAL HISTORY   No past surgical history on file. SOCIAL HISTORY   Tobacco:   reports that he has never smoked. He has never used smokeless tobacco.  Alcohol:   reports current alcohol use. Drugs:   reports no history of drug use.     FAMILY HISTORY   Family History   Problem Relation Age of Onset    Diabetes Paternal Uncle     Diabetes Father        ALLERGIES AND DRUG REACTIONS   No Known Allergies    CURRENT MEDICATIONS   Current Outpatient Medications   Medication Sig Dispense Refill    Continuous Blood Gluc Sensor (FREESTYLE AVERY 3 SENSOR) MISC To change every 14 days 6 each 3    glipiZIDE (GLUCOTROL XL) 10 MG extended release tablet Take 1 tablet daily 90 tablet 1    metFORMIN (GLUCOPHAGE-XR) 500 MG extended release tablet Take 4 tablets by mouth daily (with breakfast) 360 tablet 1    JARDIANCE 10 MG tablet Take 1 tablet by mouth daily 30 tablet 11    rosuvastatin (CRESTOR) 10 MG tablet       amLODIPine (NORVASC) 5 MG tablet Take 5 mg by mouth daily      atorvastatin (LIPITOR) 40 MG tablet Take 40 mg by mouth daily      zolpidem (AMBIEN) 10 MG tablet Take 5 mg by mouth nightly as needed for Sleep. Cholecalciferol (VITAMIN D3) 125 MCG (5000 UT) TABS Take by mouth daily      sildenafil (REVATIO) 20 MG tablet       valACYclovir (VALTREX) 1 g tablet  (Patient not taking: Reported on 1/24/2023)      brompheniramine-pseudoephedrine-DM 2-30-10 MG/5ML syrup Take 5 mLs by mouth 4 times daily as needed for Congestion or Cough 118 mL 2    Ascorbic Acid (VITAMIN C) 250 MG tablet Take 5,000 mg by mouth daily      ZINC PO Take by mouth      pantoprazole (PROTONIX) 40 MG tablet Take 40 mg by mouth daily      Multiple Vitamins-Minerals (MULTIVITAL) TABS Take by mouth daily      aspirin 81 MG tablet Take 81 mg by mouth daily      ibuprofen (IBU) 600 MG tablet Take 1 tablet by mouth every 8 hours as needed for Pain. 20 tablet 0     No current facility-administered medications for this visit. Review of Systems  Constitutional: No fever, no chills, no diaphoresis, no generalized weakness. HEENT: No blurred vision, No sore throat, no ear pain, no hair loss  Neck: denied any neck swelling, difficulty swallowing,   Cardio-pulmonary: No CP, SOB or palpitation, No orthopnea or PND. No cough or wheezing.   GI: No N/V/D, no constipation, No abdominal pain, no melena or hematochezia   : Denied any dysuria, hematuria, flank pain, discharge, or incontinence. Skin: denied any rash, ulcer, Hirsute, or hyperpigmentation. MSK: denied any joint deformity, joint pain/swelling, muscle pain, or back pain. Neuro: no numbness, no tingling, no weakness, _    OBJECTIVE    BP (!) 148/83   Pulse 67   Wt 217 lb (98.4 kg)   SpO2 97%   BMI 31.14 kg/m²   BP Readings from Last 4 Encounters:   01/24/23 (!) 148/83   09/27/22 137/76   05/24/22 (!) 143/90   02/28/22 138/77     Wt Readings from Last 6 Encounters:   01/24/23 217 lb (98.4 kg)   09/27/22 222 lb (100.7 kg)   05/24/22 221 lb (100.2 kg)   02/28/22 220 lb (99.8 kg)   12/30/21 220 lb (99.8 kg)   08/24/21 224 lb (101.6 kg)       Physical examination:  General: awake alert, oriented x3, no abnormal position or movements. HEENT: normocephalic non-traumatic, no exophthalmos   Neck: supple, no LN enlargement, no thyromegaly, no thyroid tenderness, no JVD. Pulm: Clear equal air entry no added sounds, no wheezing or rhonchi    CVS: S1 + S2, no murmur, no heave. Dorsalis pedis pulse palpable   Abd: soft lax, no tenderness, no organomegaly, audible bowel sounds. Skin: warm, no lesions, no rash.  No callus, no Ulcers, No acanthosis nigricans  Musculoskeletal: No back tenderness, no kyphosis/scoliosis    Neuro: CN intact, good sensation on monofilament testing  , muscle power normal  Psych: normal mood, and affect      Review of Laboratory Data:  I personally reviewed the following lab:  Lab Results   Component Value Date/Time    WBC 5.7 10/08/2012 11:46 AM    RBC 4.82 10/08/2012 11:46 AM    HGB 14.4 10/08/2012 11:46 AM    HCT 42.9 10/08/2012 11:46 AM    MCV 89.0 10/08/2012 11:46 AM    MCH 29.9 10/08/2012 11:46 AM    MCHC 33.5 10/08/2012 11:46 AM    RDW 13.9 10/08/2012 11:46 AM     10/08/2012 11:46 AM    MPV 9.7 10/08/2012 11:46 AM      Lab Results   Component Value Date/Time     10/22/2019 11:06 AM    K 4.4 10/22/2019 11:06 AM    CO2 30 (H) 10/22/2019 11:06 AM    BUN 12 10/22/2019 11:06 AM    CREATININE 0.8 10/22/2019 11:06 AM    CALCIUM 9.3 10/22/2019 11:06 AM    LABGLOM >60 10/22/2019 11:06 AM    GFRAA >60 10/22/2019 11:06 AM      Lab Results   Component Value Date/Time    TSH 1.339 10/08/2012 11:46 AM     Lab Results   Component Value Date/Time    LABA1C 7.6 01/24/2023 08:36 AM    GLUCOSE 162 10/22/2019 11:06 AM     Lab Results   Component Value Date/Time    LABA1C 7.6 01/24/2023 08:36 AM    LABA1C 7.4 09/27/2022 09:15 AM    LABA1C 7.3 05/24/2022 12:00 AM     Lab Results   Component Value Date/Time    TRIG 74 10/08/2012 11:46 AM    HDL 61.3 10/08/2012 11:46 AM    LDLCALC 122 10/08/2012 11:46 AM    CHOL 198 10/08/2012 11:46 AM     No results found for: Favian Quintanilla, a 63 y.o.-old male seen in for the following issues      Diagnosis Orders   1. Type 2 diabetes mellitus without complication, without long-term current use of insulin (HCC)  POCT glycosylated hemoglobin (Hb A1C)        Diabetes Mellitus Type 2    A1C 7.6%  Continue Metformin 500 mg 2000 Mg daily, Glipizide ER 10 mg daily, Jardiance 10 mg daily   Discussed with patient A1c and blood sugar goals   The patient counseled about the complications of uncontrolled diabetes   Patient was counselled about the importance of self-blood glucose monitoring and eating consistent carb diet to avoid blood sugar fluctuations   DM labs before next OV     Dietary noncompliance  Improving   Discussed with patient the importance of eating consistent carbohydrate meals, avoiding high glycemic index food. Also, discussed with patient the risk and negative consequences of dietary noncompliance on blood glucose control, blood pressure and weight    Hyperlipidemia  Continue statin. Lipid kacey before next visit     Type 2 diabetes mellitus with polyneuropathy  Following with podiatry.   Advised optimal foot care    I personally reviewed external notes from PCP and other patient's care team providers, and personally interpreted labs associated with the above diagnosis. I also ordered labs to further assess and manage the above addressed medical conditions    Return in about 4 months (around 5/24/2023) for DM type 2, VitD deficiency. The above issues were reviewed with the patient who understood and agreed with the plan. Thank you for allowing us to participate in the care of this patient. Please do not hesitate to contact us with any additional questions. Diagnosis Orders   1. Type 2 diabetes mellitus without complication, without long-term current use of insulin (HCC)  POCT glycosylated hemoglobin (Hb A1C)    empagliflozin (JARDIANCE) 25 MG tablet      2. Dietary noncompliance        3. Mixed hyperlipidemia        4. Vitamin D deficiency          Kayode Choi MD  Endocrinologist, [facility]   [address] [place] [address]   Phone: [phone]  Fax: [phone]  --------------------------------------------  An electronic signature was used to authenticate this note.  Chanel Robins MD  on 1/24/2023 at 8:55 AM

## 2023-05-25 ENCOUNTER — OFFICE VISIT (OUTPATIENT)
Dept: ENDOCRINOLOGY | Age: 63
End: 2023-05-25
Payer: COMMERCIAL

## 2023-05-25 VITALS
WEIGHT: 214 LBS | HEART RATE: 69 BPM | DIASTOLIC BLOOD PRESSURE: 92 MMHG | BODY MASS INDEX: 30.64 KG/M2 | HEIGHT: 70 IN | OXYGEN SATURATION: 96 % | SYSTOLIC BLOOD PRESSURE: 142 MMHG

## 2023-05-25 DIAGNOSIS — E55.9 VITAMIN D DEFICIENCY: ICD-10-CM

## 2023-05-25 DIAGNOSIS — E78.2 MIXED HYPERLIPIDEMIA: ICD-10-CM

## 2023-05-25 DIAGNOSIS — Z91.119 DIETARY NONCOMPLIANCE: ICD-10-CM

## 2023-05-25 DIAGNOSIS — E11.9 TYPE 2 DIABETES MELLITUS WITHOUT COMPLICATION, WITHOUT LONG-TERM CURRENT USE OF INSULIN (HCC): Primary | ICD-10-CM

## 2023-05-25 LAB — HBA1C MFR BLD: 6.6 %

## 2023-05-25 PROCEDURE — 83036 HEMOGLOBIN GLYCOSYLATED A1C: CPT | Performed by: INTERNAL MEDICINE

## 2023-05-25 PROCEDURE — 99214 OFFICE O/P EST MOD 30 MIN: CPT | Performed by: INTERNAL MEDICINE

## 2023-05-25 PROCEDURE — 3044F HG A1C LEVEL LT 7.0%: CPT | Performed by: INTERNAL MEDICINE

## 2023-05-25 RX ORDER — GLIPIZIDE 10 MG/1
TABLET, FILM COATED, EXTENDED RELEASE ORAL
Qty: 90 TABLET | Refills: 3 | Status: SHIPPED | OUTPATIENT
Start: 2023-05-25

## 2023-05-25 RX ORDER — METFORMIN HYDROCHLORIDE 500 MG/1
2000 TABLET, EXTENDED RELEASE ORAL
Qty: 360 TABLET | Refills: 3 | Status: SHIPPED | OUTPATIENT
Start: 2023-05-25

## 2023-05-25 NOTE — PROGRESS NOTES
any rash, ulcer, Hirsute, or hyperpigmentation. MSK: denied any joint deformity, joint pain/swelling, muscle pain, or back pain. Neuro: no numbness, no tingling, no weakness, _    OBJECTIVE    BP (!) 142/92   Pulse 69   Ht 5' 10\" (1.778 m)   Wt 214 lb (97.1 kg)   SpO2 96%   BMI 30.71 kg/m²   BP Readings from Last 4 Encounters:   05/25/23 (!) 142/92   01/24/23 (!) 148/83   09/27/22 137/76   05/24/22 (!) 143/90     Wt Readings from Last 6 Encounters:   05/25/23 214 lb (97.1 kg)   01/24/23 217 lb (98.4 kg)   09/27/22 222 lb (100.7 kg)   05/24/22 221 lb (100.2 kg)   02/28/22 220 lb (99.8 kg)   12/30/21 220 lb (99.8 kg)       Physical examination:  General: awake alert, oriented x3, no abnormal position or movements. HEENT: normocephalic non-traumatic, no exophthalmos   Neck: supple, no LN enlargement, no thyromegaly, no thyroid tenderness, no JVD. Pulm: Clear equal air entry no added sounds, no wheezing or rhonchi    CVS: S1 + S2, no murmur, no heave. Dorsalis pedis pulse palpable   Abd: soft lax, no tenderness, no organomegaly, audible bowel sounds. Skin: warm, no lesions, no rash.  No callus, no Ulcers, No acanthosis nigricans  Musculoskeletal: No back tenderness, no kyphosis/scoliosis    Neuro: CN intact, good sensation on monofilament testing  , muscle power normal  Psych: normal mood, and affect      Review of Laboratory Data:  I personally reviewed the following lab:  Lab Results   Component Value Date/Time    WBC 5.7 10/08/2012 11:46 AM    RBC 4.82 10/08/2012 11:46 AM    HGB 14.4 10/08/2012 11:46 AM    HCT 42.9 10/08/2012 11:46 AM    MCV 89.0 10/08/2012 11:46 AM    MCH 29.9 10/08/2012 11:46 AM    MCHC 33.5 10/08/2012 11:46 AM    RDW 13.9 10/08/2012 11:46 AM     10/08/2012 11:46 AM    MPV 9.7 10/08/2012 11:46 AM      Lab Results   Component Value Date/Time     10/22/2019 11:06 AM    K 4.4 10/22/2019 11:06 AM    CO2 30 (H) 10/22/2019 11:06 AM    BUN 12 10/22/2019 11:06 AM    CREATININE 0.8

## 2023-05-31 ENCOUNTER — TELEPHONE (OUTPATIENT)
Dept: ENDOCRINOLOGY | Age: 63
End: 2023-05-31

## 2023-06-07 DIAGNOSIS — E11.9 TYPE 2 DIABETES MELLITUS WITHOUT COMPLICATION, WITHOUT LONG-TERM CURRENT USE OF INSULIN (HCC): Primary | ICD-10-CM

## 2023-10-04 ENCOUNTER — OFFICE VISIT (OUTPATIENT)
Dept: ENDOCRINOLOGY | Age: 63
End: 2023-10-04

## 2023-10-04 VITALS
BODY MASS INDEX: 30.92 KG/M2 | WEIGHT: 216 LBS | OXYGEN SATURATION: 96 % | HEIGHT: 70 IN | DIASTOLIC BLOOD PRESSURE: 92 MMHG | SYSTOLIC BLOOD PRESSURE: 151 MMHG | HEART RATE: 82 BPM

## 2023-10-04 DIAGNOSIS — E11.9 TYPE 2 DIABETES MELLITUS WITHOUT COMPLICATION, WITHOUT LONG-TERM CURRENT USE OF INSULIN (HCC): Primary | ICD-10-CM

## 2023-10-04 DIAGNOSIS — Z91.119 DIETARY NONCOMPLIANCE: ICD-10-CM

## 2023-10-04 DIAGNOSIS — E78.2 MIXED HYPERLIPIDEMIA: ICD-10-CM

## 2023-10-04 LAB
HBA1C MFR BLD: 6.6 %
HBA1C MFR BLD: 6.6 %

## 2023-10-04 NOTE — PROGRESS NOTES
[address] [facility] Department of Endocrinology Diabetes and Metabolism   [address] [facility] [address]   Phone: [phone]  Fax: [phone]    Date of Service: 10/4/2023  Primary Care Physician: Darrin Wiggins MD  Provider: Antonio Mahmood MD     History of Present Illness: The history is provided by the patient. No  was used. Accuracy of the patient data is excellent. Gabriel Cr is a very pleasant 66 y.o.  y.o. male seen today for follow up visit   Pt complaining of denies       Gabriel Cr was diagnosed with diabetes at age 50 and currently on Metformin 2000 Mg daily, Glipizide ER 10 mg daily, Jardiance 25 mg daily   Checks BG 1-2 times/day and readings at goal most of the time   Most recent A1c results summarized below  Lab Results   Component Value Date/Time    LABA1C 6.6 10/04/2023 02:14 PM    LABA1C 6.6 10/04/2023 02:09 PM    LABA1C 6.6 05/25/2023 11:04 AM     Patient has had no hypoglycemic episodes   The patient has been mindful of what has been eating and following diabetic diet as encouraged  I reviewed current medications and the patient has no issues with diabetes medications  Gabriel Cr is up to date with eye exam and denied any history of diabetic retinopathy  Dr Abe Park   Does perform own foot exams     Microvascular complications:  No Retinopathy, Nephropathy + Neuropathy   Macrovascular complications: no CAD, PVD, or Stroke  The patient refuses Flushot and pneumonia vaccine     PAST MEDICAL HISTORY   Past Medical History:   Diagnosis Date    Hyperlipidemia     Hypertension        PAST SURGICAL HISTORY   No past surgical history on file. SOCIAL HISTORY   Tobacco:   reports that he has never smoked. He has never used smokeless tobacco.  Alcohol:   reports current alcohol use. Drugs:   reports no history of drug use.     FAMILY HISTORY   Family History   Problem Relation Age of Onset    Diabetes Paternal Uncle     Diabetes

## 2023-10-13 ENCOUNTER — TELEPHONE (OUTPATIENT)
Dept: ADMINISTRATIVE | Age: 63
End: 2023-10-13

## 2023-10-17 ENCOUNTER — TELEPHONE (OUTPATIENT)
Dept: ENDOCRINOLOGY | Age: 63
End: 2023-10-17

## 2023-10-17 NOTE — TELEPHONE ENCOUNTER
Pt lm on clinical line asking where he could get labwork done. Spoke w/ pt suggested going to a [facility] facility/lab - they will be able to pull orders. Pt verbalized understanding, stated he will have them done when he gets back from [place].

## 2023-11-06 DIAGNOSIS — E11.9 TYPE 2 DIABETES MELLITUS WITHOUT COMPLICATION, WITHOUT LONG-TERM CURRENT USE OF INSULIN (HCC): ICD-10-CM

## 2023-11-06 LAB
ALBUMIN SERPL-MCNC: 4.4 G/DL (ref 3.5–5.2)
ALP BLD-CCNC: 74 U/L (ref 40–129)
ALT SERPL-CCNC: 25 U/L (ref 0–40)
ANION GAP SERPL CALCULATED.3IONS-SCNC: 18 MMOL/L (ref 7–16)
AST SERPL-CCNC: 25 U/L (ref 0–39)
BILIRUB SERPL-MCNC: 0.5 MG/DL (ref 0–1.2)
BUN BLDV-MCNC: 13 MG/DL (ref 6–23)
CALCIUM SERPL-MCNC: 9.7 MG/DL (ref 8.6–10.2)
CHLORIDE BLD-SCNC: 102 MMOL/L (ref 98–107)
CHOLESTEROL: 162 MG/DL
CO2: 22 MMOL/L (ref 22–29)
CREAT SERPL-MCNC: 0.6 MG/DL (ref 0.7–1.2)
CREATININE URINE: 21.4 MG/DL (ref 40–278)
GFR SERPL CREATININE-BSD FRML MDRD: >60 ML/MIN/1.73M2
GLUCOSE BLD-MCNC: 177 MG/DL (ref 74–99)
HCT VFR BLD CALC: 46.6 % (ref 37–54)
HDLC SERPL-MCNC: 65 MG/DL
HEMOGLOBIN: 15.2 G/DL (ref 12.5–16.5)
LDL CHOLESTEROL: 79 MG/DL
MCH RBC QN AUTO: 29.9 PG (ref 26–35)
MCHC RBC AUTO-ENTMCNC: 32.6 G/DL (ref 32–34.5)
MCV RBC AUTO: 91.6 FL (ref 80–99.9)
MICROALBUMIN/CREAT 24H UR: 14 MG/L (ref 0–19)
MICROALBUMIN/CREAT UR-RTO: 66 MCG/MG CREAT (ref 0–30)
PDW BLD-RTO: 13.2 % (ref 11.5–15)
PLATELET # BLD: 264 K/UL (ref 130–450)
PMV BLD AUTO: 11.4 FL (ref 7–12)
POTASSIUM SERPL-SCNC: 4.4 MMOL/L (ref 3.5–5)
RBC # BLD: 5.09 M/UL (ref 3.8–5.8)
SODIUM BLD-SCNC: 142 MMOL/L (ref 132–146)
TOTAL PROTEIN: 7.4 G/DL (ref 6.4–8.3)
TRIGL SERPL-MCNC: 88 MG/DL
VLDLC SERPL CALC-MCNC: 18 MG/DL
WBC # BLD: 6.6 K/UL (ref 4.5–11.5)

## 2023-11-07 ENCOUNTER — TELEPHONE (OUTPATIENT)
Dept: ENDOCRINOLOGY | Age: 63
End: 2023-11-07

## 2023-11-07 NOTE — TELEPHONE ENCOUNTER
Notify patient  All results are very good apart from urine protein which was slightly above normal limit. Please continue current regimen including the Jardiance. In the settings of proteinuria Jardiance provide significant protection against kidney disease and the diabetes.   We will discuss this in details at your next office visit

## 2023-11-09 NOTE — TELEPHONE ENCOUNTER
Patient notified he understood, he also stated he is getting bruising with the Orvil Ruddle , told said he was going to try it on the other arm if it still continues he will discontinue the Orvil Ruddle

## 2023-11-22 ENCOUNTER — TELEPHONE (OUTPATIENT)
Dept: ENDOCRINOLOGY | Age: 63
End: 2023-11-22

## 2023-11-22 DIAGNOSIS — E11.9 TYPE 2 DIABETES MELLITUS WITHOUT COMPLICATION, WITHOUT LONG-TERM CURRENT USE OF INSULIN (HCC): ICD-10-CM

## 2023-11-22 NOTE — TELEPHONE ENCOUNTER
Pt lm on clinical line asking for lab results, and stating he has questions regarding medication. Returned pt's call - no ans, anson on vm.
(4) no impairment

## 2024-01-02 NOTE — TELEPHONE ENCOUNTER
Patient could potentially stop glipizide and start Rybelsus 3 mg daily for 4 weeks then increase to 7 mg thereafter as long as patient has no contraindications for medication use including pancreatitis, gastroparesis, medullary thyroid cancer, or Men2.  If patient would like to attempt please call into pharmacy  
Spoke with pt, ok to start Rybelsus. Pended, please look over and send. Thanks!  
The pt is interested in discontinuing one of his current medications and starting Rybelsus. Is that an option for him?  
Note Form: Pt is here for her follow up after colonoscopy and ultrasound  Stated she has diarrhea and bloating in the mornings  History of Present Illness   HPI: 51-year-old female was morbid obesity presented for follow-up after colonoscopy  Patient has regular loose stool every morning followed by another 1 Episode of loose stool daily  This has been going on for 8 months  she has been taking Arthrotec which contains diclofenac for 5 years  Patient could not stop the medication as she has ankle injury status post surgery  Her colonoscopy showed good prep and 1 small hyperplastic polyp was removed  Review of Systems   Complete-Female GI Adult:      Constitutional: No fever, no chills, feels well, no tiredness, no recent weight gain or weight loss  Eyes: No complaints of eye pain, no red eyes, no eyesight problems, no discharge, no dry eyes, no itching of eyes  ENT: no complaints of earache, no loss of hearing, no nose bleeds, no nasal discharge, no sore throat, no hoarseness  Cardiovascular: No complaints of slow heart rate, no fast heart rate, no chest pain, no palpitations, no leg claudication, no lower extremity edema  Respiratory: No complaints of shortness of breath, no wheezing, no cough, no SOB on exertion, no orthopnea, no PND  Gastrointestinal: diarrhea-- and-- bloating  Genitourinary: No complaints of dysuria, no incontinence, no pelvic pain, no dysmenorrhea, no vaginal discharge or bleeding  Musculoskeletal: No complaints of arthralgias, no myalgias, no joint swelling or stiffness, no limb pain or swelling  Integumentary: No complaints of skin rash or lesions, no itching, no skin wounds, no breast pain or lump  Neurological: No complaints of headache, no confusion, no convulsions, no numbness, no dizziness or fainting, no tingling, no limb weakness, no difficulty walking        Psychiatric: Not suicidal, no sleep disturbance, no anxiety or depression,
no change in personality, no emotional problems  Endocrine: No complaints of proptosis, no hot flashes, no muscle weakness, no deepening of the voice, no feelings of weakness  Hematologic/Lymphatic: No complaints of swollen glands, no swollen glands in the neck, does not bleed easily, does not bruise easily  ROS Reviewed:    ROS reviewed  Active Problems   1  Abnormal liver enzymes (790 5) (R74 8)   2  Ankle pain, unspecified laterality   3  Colon cancer screening (V76 51) (Z12 11)   4  Diabetes mellitus, type 2 (250 00) (E11 9)   5  Encounter for screening mammogram for breast cancer (V76 12) (Z12 31)   6  Essential tremor (333 1) (G25 0)   7  Fibromyalgia (729 1) (M79 7)   8  Flu vaccine need (V04 81) (Z23)   9  Gait disorder (781 2) (R26 9)   10  History of colon polyps (V12 72) (Z86 010)   11  Hyperlipidemia (272 4) (E78 5)   12  Hypertension (401 9) (I10)   13  Influenza vaccination administered during current admission (V04 81) (Z23)   14  Irritable bowel syndrome (564 1) (K58 9)   15  Lower back pain (724 2) (M54 5)   16  Major depressive disorder, recurrent episode, moderate (296 32) (F33 1)   17  Morbid obesity with BMI of 50 0-59 9, adult (278 01,V85 43) (E66 01,Z68 43)   18  Need for hepatitis C screening test (V73 89) (Z11 59)   19  Pap smear for cervical cancer screening (V76 2) (Z12 4)   20  Rosacea (695 3) (L71 9)   21  Serous otitis media (381 4) (H65 90)   22  Situational depression (309 0) (F43 21)   23  Tinea cruris (110 3) (B35 6)   24  Urinary incontinence (788 30) (R32)   25  Visit for screening mammogram (V76 12) (Z12 31)   26  Vitamin D deficiency (268 9) (E55 9)    Past Medical History   1  History of Dysfunction of Eustachian tube, unspecified laterality (381 81) (H69 80)   2  History of Endometriosis (617 9) (N80 9)   3  History of acute sinusitis (V12 69) (Z87 09)   4  History of anemia (V12 3) (Z86 2)   5  History of Bell's palsy (V12 49) (Z86 69)   6   History of
Otalgia, unspecified laterality (388 70) (H92 09)    Surgical History   1  History of Ankle Surgery   2  History of  Section   3  History of Spinal Anesthesia Epidural   4  History of Total Hip Replacement    Family History   Mother    1  Family history of CABG  Father    2  Family history of Father  At Age 77   1  Family history of Gangrene    Social History    · Alcohol Use (History)   · Never smoker    Current Meds    1  Arthrotec 75-0 2 MG Oral Tablet Delayed Release; 1 PO BID; Therapy: 27JJK1077 to Recorded   2  Fluticasone Propionate 50 MCG/ACT Nasal Suspension; USE 2 SPRAYS IN EACH     NOSTRIL ONCE DAILY; Therapy: 01NJF4454 to (Gabriela Gupta)  Requested for: 49HXT1766; Last     Rx:56Lxa0607 Ordered   3  MetFORMIN HCl - 1000 MG Oral Tablet; take 1 tablet twice a day; Therapy: 07TSU5934 to (Last Rx:2017)  Requested for: 70TWI7879 Ordered   4  Nystatin 452027 UNIT/GM External Cream; APPLY AND RUB IN A THIN FILM TO     AFFECTED AREAS TWICE DAILY (IN THE MORNING AND IN THE EVENING); Therapy: 80GGU6522 to (Last Rx:2016)  Requested for: 2016 Ordered   5  Nystop 021655 UNIT/GM External Powder; APPLY TO AFFECTED AREA TWICE DAILY AS     DIRECTED; Therapy: 32RWY1944 to (Evaluate:2017)  Requested for: 23Icp8920; Last     Rx:52Jas2104 Ordered   6  OneTouch UltraSoft Lancets Miscellaneous; TEST TWICE DAILY OR AS DIRECTED; Therapy: 12DPI6759 to (Last Rx:2017)  Requested for: 2017 Ordered   7  OneTouch Verio In [employer]; TEST 1-2 x daily as directed by physician; Therapy: 14PYU3822 to (Last Rx:2017)  Requested for: 2017 Ordered   8  OneTouch Verio w/Device Kit; USE AS DIRECTED; Therapy: 26JDI0406 to (Last Rx:2017) Ordered   9  Quinapril HCl - 40 MG Oral Tablet; Take 1 tablet daily; Therapy: 72RLB9010 to (Last Rx:2017)  Requested for: 2017 Ordered   10  Simvastatin 40 MG Oral Tablet;  Take 1 tablet daily;
Therapy: 97OBJ6601 to (Last Rx:01Sep2017)  Requested for: 01Sep2017 Ordered   11  Vitamin D 2000 UNIT Oral Capsule; take 3 daily; Therapy: 50Ruj9322 to Recorded    Allergies   1  Augmentin TABS   2  Doxycycline Hyclate CAPS   3  Erythromycin TABS   4  Lodine CAPS   5  LORazepam TABS   6  Sulfa Drugs    Physical Exam        Constitutional      General appearance: No acute distress, well appearing and well nourished  Eyes      Conjunctiva and lids: No swelling, erythema or discharge  Ears, Nose, Mouth, and Throat      Oropharynx: Normal with no erythema, edema, exudate or lesions  Pulmonary      Respiratory effort: No increased work of breathing or signs of respiratory distress  Cardiovascular      Examination of extremities for edema and/or varicosities: Normal        Abdomen      Abdomen: Non-tender, no masses  Musculoskeletal      Gait and station: Normal        Psychiatric      Orientation to person, place, and time: Normal           Health Management   Diabetes mellitus, type 2   *VB - Eye Exam; every 2 years; Last 26Apr2016; Next Due: 32DFZ3719; Active  *VB - Foot Exam; every 1 year; Last 97AQG2371; Next Due: 57AVM5652; Active  History of colon polyps   COLONOSCOPY (GI, SURG); every 5 years; Last 89Opf4847; Next Due: 64FIL9513; Active  History of Encounter for screening colonoscopy   COLONOSCOPY; every 10 years; Last 68Prh3880; Next Due: 83FFH6317; Overdue  History of Encounter for screening for osteoporosis   * Dexa Scan Axial Skel (Hip, Pelvis, Spine); every 2 years; Next Due: 53RVX9355; Active  Pap smear for cervical cancer screening   (every) THINPREP PAP; every 3 years; Next Due: 70UGO8967; Overdue  Visit for screening mammogram   Digital Bilateral Screening Mammogram With CAD; every 1 year; Last 26YKW8209; Next Due:    77XLJ6714; Overdue    Future Appointments      Date/Time Provider Specialty Site   12/20/2017 10:30 AM BETSEY Real   [facility]
I[facility]ha [address] MEDICINE     Signatures    Electronically signed by : Emery Cortes MD; Nov 6 2017  3:35PM EST                       (Author)     Electronically signed by : Emery Cortes MD; Dec 20 2017  1:57PM EST                       (Author)
Detail Level: Detailed
Quality 130: Documentation Of Current Medications In The Medical Record: Current Medications Documented

## 2024-01-03 RX ORDER — ORAL SEMAGLUTIDE 3 MG/1
3 TABLET ORAL DAILY
Qty: 30 TABLET | Refills: 5 | Status: SHIPPED | OUTPATIENT
Start: 2024-01-03

## 2024-02-01 VITALS
RESPIRATION RATE: 18 BRPM | SYSTOLIC BLOOD PRESSURE: 134 MMHG | OXYGEN SATURATION: 96 % | DIASTOLIC BLOOD PRESSURE: 74 MMHG | HEIGHT: 68 IN | BODY MASS INDEX: 32.22 KG/M2 | WEIGHT: 212.6 LBS | TEMPERATURE: 97.8 F | HEART RATE: 86 BPM

## 2024-02-07 ENCOUNTER — TELEPHONE (OUTPATIENT)
Age: 64
End: 2024-02-07

## 2024-02-07 DIAGNOSIS — F51.01 PRIMARY INSOMNIA: ICD-10-CM

## 2024-02-07 DIAGNOSIS — F51.01 PRIMARY INSOMNIA: Primary | ICD-10-CM

## 2024-02-07 RX ORDER — BENZONATATE 100 MG/1
100 CAPSULE ORAL 3 TIMES DAILY PRN
Qty: 30 CAPSULE | Refills: 0 | Status: SHIPPED | OUTPATIENT
Start: 2024-02-07 | End: 2024-02-17

## 2024-02-07 RX ORDER — ZOLPIDEM TARTRATE 10 MG/1
5 TABLET ORAL NIGHTLY PRN
Qty: 50 TABLET | Refills: 2 | Status: SHIPPED | OUTPATIENT
Start: 2024-02-07 | End: 2024-03-08

## 2024-02-07 NOTE — TELEPHONE ENCOUNTER
I will order medication for patient. As for antibiotic should he make appt to be prescribed antibiotic.

## 2024-02-07 NOTE — TELEPHONE ENCOUNTER
PATIENT CALLED STATING HE NEEDS REFILL OF ZOLPIDEM TART 100 MG #30 ONE TABLET AT BEDTIME WITH 2 REFILLS. HE REQUESTED A QUANTITY OF #50 BECAUSE HE IS LEAVING FOR [place] AND DOESN'T WANT TO RUN OUT. PATIENT ALSO REQUESTED PRESCRIPTION FOR BENZONATATE 100 MG #21 ONE TABLET THREE TIMES A DAY AS NEEDED FOR COUGH X 7 DAYS. PHARMACY IS [facility] [phone]

## 2024-02-12 DIAGNOSIS — E11.9 TYPE 2 DIABETES MELLITUS WITHOUT COMPLICATION, WITHOUT LONG-TERM CURRENT USE OF INSULIN (HCC): Primary | ICD-10-CM

## 2024-02-12 RX ORDER — ORAL SEMAGLUTIDE 3 MG/1
3 TABLET ORAL DAILY
Qty: 30 TABLET | Refills: 5 | Status: SHIPPED | OUTPATIENT
Start: 2024-02-12

## 2024-02-26 DIAGNOSIS — J32.9 OTHER SINUSITIS, UNSPECIFIED CHRONICITY: Primary | ICD-10-CM

## 2024-02-26 RX ORDER — AZITHROMYCIN 250 MG/1
TABLET, FILM COATED ORAL
Qty: 6 TABLET | Refills: 0 | Status: SHIPPED | OUTPATIENT
Start: 2024-02-26 | End: 2024-03-07

## 2024-03-13 ENCOUNTER — OFFICE VISIT (OUTPATIENT)
Age: 64
End: 2024-03-13
Payer: COMMERCIAL

## 2024-03-13 VITALS
RESPIRATION RATE: 18 BRPM | WEIGHT: 212.8 LBS | OXYGEN SATURATION: 97 % | HEART RATE: 73 BPM | BODY MASS INDEX: 30.46 KG/M2 | SYSTOLIC BLOOD PRESSURE: 128 MMHG | TEMPERATURE: 98.4 F | HEIGHT: 70 IN | DIASTOLIC BLOOD PRESSURE: 78 MMHG

## 2024-03-13 DIAGNOSIS — R53.83 FATIGUE, UNSPECIFIED TYPE: Primary | ICD-10-CM

## 2024-03-13 DIAGNOSIS — E03.9 HYPOTHYROIDISM, UNSPECIFIED TYPE: ICD-10-CM

## 2024-03-13 DIAGNOSIS — Z12.5 SCREENING FOR PROSTATE CANCER: ICD-10-CM

## 2024-03-13 DIAGNOSIS — E78.5 HYPERLIPIDEMIA, UNSPECIFIED HYPERLIPIDEMIA TYPE: ICD-10-CM

## 2024-03-13 DIAGNOSIS — I10 ESSENTIAL HYPERTENSION, BENIGN: ICD-10-CM

## 2024-03-13 DIAGNOSIS — E11.9 DIABETES MELLITUS WITHOUT COMPLICATION (HCC): ICD-10-CM

## 2024-03-13 PROCEDURE — 99214 OFFICE O/P EST MOD 30 MIN: CPT | Performed by: FAMILY MEDICINE

## 2024-03-13 PROCEDURE — 3074F SYST BP LT 130 MM HG: CPT | Performed by: FAMILY MEDICINE

## 2024-03-13 PROCEDURE — 3078F DIAST BP <80 MM HG: CPT | Performed by: FAMILY MEDICINE

## 2024-03-13 RX ORDER — ZOLPIDEM TARTRATE 10 MG/1
10 TABLET ORAL NIGHTLY PRN
COMMUNITY

## 2024-03-13 RX ORDER — ROSUVASTATIN CALCIUM 10 MG/1
10 TABLET, COATED ORAL DAILY
Qty: 90 TABLET | Refills: 3 | Status: SHIPPED | OUTPATIENT
Start: 2024-03-13 | End: 2025-03-08

## 2024-03-13 SDOH — ECONOMIC STABILITY: FOOD INSECURITY: WITHIN THE PAST 12 MONTHS, THE FOOD YOU BOUGHT JUST DIDN'T LAST AND YOU DIDN'T HAVE MONEY TO GET MORE.: NEVER TRUE

## 2024-03-13 SDOH — ECONOMIC STABILITY: INCOME INSECURITY: HOW HARD IS IT FOR YOU TO PAY FOR THE VERY BASICS LIKE FOOD, HOUSING, MEDICAL CARE, AND HEATING?: NOT HARD AT ALL

## 2024-03-13 SDOH — ECONOMIC STABILITY: HOUSING INSECURITY
IN THE LAST 12 MONTHS, WAS THERE A TIME WHEN YOU DID NOT HAVE A STEADY PLACE TO SLEEP OR SLEPT IN A SHELTER (INCLUDING NOW)?: NO

## 2024-03-13 SDOH — ECONOMIC STABILITY: FOOD INSECURITY: WITHIN THE PAST 12 MONTHS, YOU WORRIED THAT YOUR FOOD WOULD RUN OUT BEFORE YOU GOT MONEY TO BUY MORE.: NEVER TRUE

## 2024-03-13 ASSESSMENT — PATIENT HEALTH QUESTIONNAIRE - PHQ9
SUM OF ALL RESPONSES TO PHQ QUESTIONS 1-9: 0
1. LITTLE INTEREST OR PLEASURE IN DOING THINGS: 0
SUM OF ALL RESPONSES TO PHQ QUESTIONS 1-9: 0
SUM OF ALL RESPONSES TO PHQ QUESTIONS 1-9: 0
SUM OF ALL RESPONSES TO PHQ9 QUESTIONS 1 & 2: 0
2. FEELING DOWN, DEPRESSED OR HOPELESS: 0
SUM OF ALL RESPONSES TO PHQ QUESTIONS 1-9: 0

## 2024-03-14 ASSESSMENT — ENCOUNTER SYMPTOMS
GASTROINTESTINAL NEGATIVE: 1
RESPIRATORY NEGATIVE: 1

## 2024-03-14 NOTE — PROGRESS NOTES
An electronic signature was used to authenticate this note.    --Kostas Gustafson MD     Note: This report was transcribed using Dragon voice recognition software.  Every effort was made to ensure accuracy, however inadvertent computerized transcription errors may be present.

## 2024-04-12 PROBLEM — Z12.5 SCREENING FOR PROSTATE CANCER: Status: RESOLVED | Noted: 2024-03-13 | Resolved: 2024-04-12

## 2024-04-18 DIAGNOSIS — E11.9 TYPE 2 DIABETES MELLITUS WITHOUT COMPLICATION, WITHOUT LONG-TERM CURRENT USE OF INSULIN (HCC): ICD-10-CM

## 2024-04-18 RX ORDER — METFORMIN HYDROCHLORIDE 500 MG/1
500 TABLET, EXTENDED RELEASE ORAL 4 TIMES DAILY
Qty: 360 TABLET | Refills: 3 | Status: SHIPPED | OUTPATIENT
Start: 2024-04-18

## 2024-04-18 NOTE — TELEPHONE ENCOUNTER
Patient called for medication refill    Requested Prescriptions     Pending Prescriptions Disp Refills    metFORMIN (GLUCOPHAGE-XR) 500 MG extended release tablet 360 tablet 3     Sig: Take 2 tablets by mouth in the morning and at bedtime     Last Appt: 3/13/2024   Next Appt: 11/6/2024

## 2024-06-04 ENCOUNTER — TELEPHONE (OUTPATIENT)
Dept: ENDOCRINOLOGY | Age: 64
End: 2024-06-04

## 2024-06-04 NOTE — TELEPHONE ENCOUNTER
Patient called had question regarding his RYBELSUS  returned his call only left message to call back

## 2024-06-05 ENCOUNTER — TELEPHONE (OUTPATIENT)
Dept: ENDOCRINOLOGY | Age: 64
End: 2024-06-05

## 2024-06-05 DIAGNOSIS — E11.9 TYPE 2 DIABETES MELLITUS WITHOUT COMPLICATION, WITHOUT LONG-TERM CURRENT USE OF INSULIN (HCC): Primary | ICD-10-CM

## 2024-06-05 RX ORDER — ORAL SEMAGLUTIDE 7 MG/1
TABLET ORAL
Qty: 30 TABLET | Refills: 3 | Status: SHIPPED | OUTPATIENT
Start: 2024-06-05

## 2024-06-05 NOTE — TELEPHONE ENCOUNTER
Patient called 11/2023 was interested in stopping glipizide and starting Rybelsus. You agreed and started him on 3mg and said to call in 4wks to increase to 7mg. Its been about 6 wks or more per patient. He says he feels good on this dose. Will still increase if you recommend though. Attached sravani report. Per pt may not be correct he has many issues with sensors.

## 2024-06-06 DIAGNOSIS — E11.9 TYPE 2 DIABETES MELLITUS WITHOUT COMPLICATION, WITHOUT LONG-TERM CURRENT USE OF INSULIN (HCC): Primary | ICD-10-CM

## 2024-07-02 DIAGNOSIS — E11.9 TYPE 2 DIABETES MELLITUS WITHOUT COMPLICATION, WITHOUT LONG-TERM CURRENT USE OF INSULIN (HCC): ICD-10-CM

## 2024-07-02 DIAGNOSIS — G47.00 INSOMNIA, UNSPECIFIED TYPE: Primary | ICD-10-CM

## 2024-07-02 RX ORDER — ZOLPIDEM TARTRATE 10 MG/1
10 TABLET ORAL NIGHTLY PRN
Qty: 90 TABLET | Refills: 0 | Status: SHIPPED | OUTPATIENT
Start: 2024-07-02 | End: 2024-09-30

## 2024-07-02 NOTE — TELEPHONE ENCOUNTER
Patient called for medication refill    Requested Prescriptions     Pending Prescriptions Disp Refills    zolpidem (AMBIEN) 10 MG tablet 90 tablet 0     Sig: Take 1 tablet by mouth nightly as needed for Sleep for up to 90 days. Max Daily Amount: 10 mg     Last Appt: 3/13/2024   Next Appt: 11/6/2024

## 2024-07-09 DIAGNOSIS — E11.9 TYPE 2 DIABETES MELLITUS WITHOUT COMPLICATION, WITHOUT LONG-TERM CURRENT USE OF INSULIN (HCC): ICD-10-CM

## 2024-07-09 RX ORDER — METFORMIN HYDROCHLORIDE 500 MG/1
500 TABLET, EXTENDED RELEASE ORAL 4 TIMES DAILY
Qty: 360 TABLET | Refills: 0 | Status: SHIPPED | OUTPATIENT
Start: 2024-07-09

## 2024-09-12 ENCOUNTER — OFFICE VISIT (OUTPATIENT)
Dept: ENDOCRINOLOGY | Age: 64
End: 2024-09-12

## 2024-09-12 VITALS
SYSTOLIC BLOOD PRESSURE: 145 MMHG | HEART RATE: 67 BPM | HEIGHT: 70 IN | OXYGEN SATURATION: 97 % | DIASTOLIC BLOOD PRESSURE: 83 MMHG | BODY MASS INDEX: 29.2 KG/M2 | WEIGHT: 204 LBS

## 2024-09-12 DIAGNOSIS — Z91.119 DIETARY NONCOMPLIANCE: ICD-10-CM

## 2024-09-12 DIAGNOSIS — Z97.8 USES SELF-APPLIED CONTINUOUS GLUCOSE MONITORING DEVICE: ICD-10-CM

## 2024-09-12 DIAGNOSIS — E55.9 VITAMIN D DEFICIENCY: ICD-10-CM

## 2024-09-12 DIAGNOSIS — E11.9 TYPE 2 DIABETES MELLITUS WITHOUT COMPLICATION, WITHOUT LONG-TERM CURRENT USE OF INSULIN (HCC): Primary | ICD-10-CM

## 2024-09-12 DIAGNOSIS — E78.5 HYPERLIPIDEMIA, UNSPECIFIED HYPERLIPIDEMIA TYPE: ICD-10-CM

## 2024-09-12 DIAGNOSIS — E78.2 MIXED HYPERLIPIDEMIA: ICD-10-CM

## 2024-09-12 LAB — HBA1C MFR BLD: 7.3 %

## 2024-09-12 RX ORDER — ORAL SEMAGLUTIDE 7 MG/1
TABLET ORAL
Qty: 11 TABLET | Refills: 3 | Status: SHIPPED
Start: 2024-09-12 | End: 2024-09-12 | Stop reason: SDUPTHER

## 2024-09-12 RX ORDER — ORAL SEMAGLUTIDE 7 MG/1
TABLET ORAL
Qty: 11 TABLET | Refills: 3 | Status: SHIPPED | OUTPATIENT
Start: 2024-09-12

## 2024-09-12 RX ORDER — METFORMIN HCL 500 MG
500 TABLET, EXTENDED RELEASE 24 HR ORAL 4 TIMES DAILY
Qty: 360 TABLET | Refills: 3 | Status: SHIPPED | OUTPATIENT
Start: 2024-09-12

## 2024-09-12 RX ORDER — METFORMIN HCL 500 MG
500 TABLET, EXTENDED RELEASE 24 HR ORAL 4 TIMES DAILY
Qty: 360 TABLET | Refills: 3 | Status: SHIPPED
Start: 2024-09-12 | End: 2024-09-12 | Stop reason: SDUPTHER

## 2024-10-02 DIAGNOSIS — E11.9 TYPE 2 DIABETES MELLITUS WITHOUT COMPLICATION, WITHOUT LONG-TERM CURRENT USE OF INSULIN (HCC): ICD-10-CM

## 2024-10-02 RX ORDER — ORAL SEMAGLUTIDE 7 MG/1
TABLET ORAL
Qty: 90 TABLET | Refills: 3 | Status: SHIPPED | OUTPATIENT
Start: 2024-10-02

## 2024-10-15 ENCOUNTER — TELEPHONE (OUTPATIENT)
Age: 64
End: 2024-10-15

## 2024-10-15 DIAGNOSIS — F51.01 PRIMARY INSOMNIA: Primary | ICD-10-CM

## 2024-10-16 RX ORDER — ZOLPIDEM TARTRATE 10 MG/1
10 TABLET ORAL NIGHTLY PRN
Qty: 90 TABLET | Refills: 0 | Status: SHIPPED | OUTPATIENT
Start: 2024-10-16 | End: 2025-01-14

## 2024-11-05 DIAGNOSIS — E11.9 DIABETES MELLITUS WITHOUT COMPLICATION (HCC): ICD-10-CM

## 2024-11-05 LAB
ALBUMIN: 4.4 G/DL (ref 3.5–5.2)
ALP BLD-CCNC: 65 U/L (ref 40–129)
ALT SERPL-CCNC: 20 U/L (ref 0–40)
ANION GAP SERPL CALCULATED.3IONS-SCNC: 14 MMOL/L (ref 7–16)
AST SERPL-CCNC: 20 U/L (ref 0–39)
BASOPHILS ABSOLUTE: 0.06 K/UL (ref 0–0.2)
BASOPHILS RELATIVE PERCENT: 1 % (ref 0–2)
BILIRUB SERPL-MCNC: 0.4 MG/DL (ref 0–1.2)
BUN BLDV-MCNC: 18 MG/DL (ref 6–23)
CALCIUM SERPL-MCNC: 9.7 MG/DL (ref 8.6–10.2)
CHLORIDE BLD-SCNC: 101 MMOL/L (ref 98–107)
CHOLESTEROL, TOTAL: 140 MG/DL
CO2: 26 MMOL/L (ref 22–29)
CREAT SERPL-MCNC: 0.7 MG/DL (ref 0.7–1.2)
EOSINOPHILS ABSOLUTE: 0.06 K/UL (ref 0.05–0.5)
EOSINOPHILS RELATIVE PERCENT: 1 % (ref 0–6)
GFR, ESTIMATED: >90 ML/MIN/1.73M2
GLUCOSE BLD-MCNC: 170 MG/DL (ref 74–99)
HBA1C MFR BLD: 8.1 % (ref 4–5.6)
HCT VFR BLD CALC: 45.6 % (ref 37–54)
HDLC SERPL-MCNC: 66 MG/DL
HEMOGLOBIN: 14.8 G/DL (ref 12.5–16.5)
IMMATURE GRANULOCYTES %: 0 % (ref 0–5)
IMMATURE GRANULOCYTES ABSOLUTE: <0.03 K/UL (ref 0–0.58)
LDL CHOLESTEROL: 61 MG/DL
LYMPHOCYTES ABSOLUTE: 1.14 K/UL (ref 1.5–4)
LYMPHOCYTES RELATIVE PERCENT: 21 % (ref 20–42)
MCH RBC QN AUTO: 30.5 PG (ref 26–35)
MCHC RBC AUTO-ENTMCNC: 32.5 G/DL (ref 32–34.5)
MCV RBC AUTO: 93.8 FL (ref 80–99.9)
MONOCYTES ABSOLUTE: 0.68 K/UL (ref 0.1–0.95)
MONOCYTES RELATIVE PERCENT: 12 % (ref 2–12)
NEUTROPHILS ABSOLUTE: 3.57 K/UL (ref 1.8–7.3)
NEUTROPHILS RELATIVE PERCENT: 65 % (ref 43–80)
PDW BLD-RTO: 13.2 % (ref 11.5–15)
PLATELET # BLD: 264 K/UL (ref 130–450)
PMV BLD AUTO: 11.7 FL (ref 7–12)
POTASSIUM SERPL-SCNC: 4.5 MMOL/L (ref 3.5–5)
RBC # BLD: 4.86 M/UL (ref 3.8–5.8)
SODIUM BLD-SCNC: 141 MMOL/L (ref 132–146)
TOTAL PROTEIN: 6.7 G/DL (ref 6.4–8.3)
TRIGL SERPL-MCNC: 67 MG/DL
TSH SERPL DL<=0.05 MIU/L-ACNC: 0.89 UIU/ML (ref 0.27–4.2)
VLDLC SERPL CALC-MCNC: 13 MG/DL
WBC # BLD: 5.5 K/UL (ref 4.5–11.5)

## 2024-11-06 ENCOUNTER — OFFICE VISIT (OUTPATIENT)
Age: 64
End: 2024-11-06

## 2024-11-06 VITALS
SYSTOLIC BLOOD PRESSURE: 130 MMHG | TEMPERATURE: 98.1 F | HEART RATE: 79 BPM | HEIGHT: 70 IN | DIASTOLIC BLOOD PRESSURE: 90 MMHG | BODY MASS INDEX: 29.15 KG/M2 | OXYGEN SATURATION: 97 % | WEIGHT: 203.6 LBS | RESPIRATION RATE: 18 BRPM

## 2024-11-06 DIAGNOSIS — Z00.00 WELL ADULT EXAM: Primary | ICD-10-CM

## 2024-11-06 DIAGNOSIS — E11.9 TYPE 2 DIABETES MELLITUS WITHOUT COMPLICATION, WITHOUT LONG-TERM CURRENT USE OF INSULIN (HCC): ICD-10-CM

## 2024-11-06 DIAGNOSIS — E78.5 HYPERLIPIDEMIA, UNSPECIFIED HYPERLIPIDEMIA TYPE: ICD-10-CM

## 2024-11-06 DIAGNOSIS — I10 ESSENTIAL HYPERTENSION, BENIGN: ICD-10-CM

## 2024-11-06 DIAGNOSIS — E03.9 HYPOTHYROIDISM, UNSPECIFIED TYPE: ICD-10-CM

## 2024-11-06 PROBLEM — R53.83 FATIGUE: Status: RESOLVED | Noted: 2024-03-13 | Resolved: 2024-11-06

## 2024-11-06 PROBLEM — Q76.2 CONGENITAL SPONDYLOLYSIS, LUMBOSACRAL REGION: Status: ACTIVE | Noted: 2024-11-06

## 2024-11-06 PROBLEM — K21.9 ESOPHAGEAL REFLUX: Chronic | Status: ACTIVE | Noted: 2024-01-05

## 2024-11-06 PROBLEM — G47.00 INSOMNIA, UNSPECIFIED: Status: ACTIVE | Noted: 2024-11-06

## 2024-11-06 LAB
BILIRUBIN, POC: NORMAL
BLOOD URINE, POC: NORMAL
CLARITY, POC: CLEAR
COLOR, POC: NORMAL
GLUCOSE URINE, POC: NORMAL MG/DL
KETONES, POC: NORMAL MG/DL
LEUKOCYTE EST, POC: NORMAL
NITRITE, POC: NORMAL
PH, POC: 6
PROTEIN, POC: NORMAL MG/DL
SPECIFIC GRAVITY, POC: 1.01
UROBILINOGEN, POC: 0.2 MG/DL

## 2024-11-06 RX ORDER — SILDENAFIL 100 MG/1
100 TABLET, FILM COATED ORAL PRN
COMMUNITY
Start: 2024-08-01

## 2024-11-06 ASSESSMENT — ENCOUNTER SYMPTOMS
GASTROINTESTINAL NEGATIVE: 1
RESPIRATORY NEGATIVE: 1

## 2024-11-06 NOTE — PROGRESS NOTES
Physical Exam  Vitals reviewed.   Constitutional:       General: He is not in acute distress.     Appearance: He is not ill-appearing or toxic-appearing.   HENT:      Mouth/Throat:      Mouth: Mucous membranes are moist.      Pharynx: Oropharynx is clear.   Eyes:      General: No scleral icterus.     Extraocular Movements: Extraocular movements intact.      Conjunctiva/sclera: Conjunctivae normal.      Pupils: Pupils are equal, round, and reactive to light.   Neck:      Vascular: No carotid bruit.   Cardiovascular:      Rate and Rhythm: Normal rate and regular rhythm.      Pulses: Normal pulses.      Heart sounds: Normal heart sounds. No murmur heard.  Pulmonary:      Effort: Pulmonary effort is normal.      Breath sounds: Normal breath sounds.   Abdominal:      General: Bowel sounds are normal.      Palpations: Abdomen is soft. There is no mass.      Tenderness: There is no abdominal tenderness.   Musculoskeletal:         General: Normal range of motion.      Cervical back: Normal range of motion and neck supple. No tenderness.      Right lower leg: No edema.      Left lower leg: No edema.   Lymphadenopathy:      Cervical: No cervical adenopathy.   Skin:     General: Skin is warm and dry.      Coloration: Skin is not jaundiced or pale.   Neurological:      General: No focal deficit present.      Mental Status: He is alert and oriented to person, place, and time. Mental status is at baseline.   Psychiatric:         Mood and Affect: Mood normal.         Behavior: Behavior normal.         Thought Content: Thought content normal.         Judgment: Judgment normal.     Visual inspection:  Deformity/amputation: absent  Skin lesions/pre-ulcerative calluses: absent  Edema: right- negative, left- negative    Sensory exam:  Monofilament sensation: normal  (minimum of 5 random plantar locations tested, avoiding callused areas - > 1 area with absence of sensation is + for neuropathy)    Plus at least one of the

## 2024-11-10 NOTE — RESULT ENCOUNTER NOTE
A1c was 8.1 which is a little high.  Kidney liver all good blood count good thyroid good cholesterol excellent at 140.

## 2024-11-15 RX ORDER — AMLODIPINE BESYLATE 5 MG/1
5 TABLET ORAL DAILY
Qty: 90 TABLET | Refills: 3 | Status: SHIPPED | OUTPATIENT
Start: 2024-11-15

## 2024-11-15 NOTE — TELEPHONE ENCOUNTER
Name of Medication(s) Requested:  Requested Prescriptions     Pending Prescriptions Disp Refills    amLODIPine (NORVASC) 5 MG tablet 90 tablet 3     Sig: Take 1 tablet by mouth daily       Medication is on current medication list Yes    Dosage and directions were verified? Yes    Quantity verified: 90 day supply     Pharmacy Verified?  Yes    Last Appointment:  11/6/2024    Future appts:  Future Appointments   Date Time Provider Department Center   1/28/2025  8:15 AM Kayode Saunders MD [facility]   5/20/2025  9:15 AM Kostas Gustafson MD [facility]        (If no appt send self scheduling link. .REFILLAPPT)  Scheduling request sent?     [x] Yes  [] No    Does patient need updated?  [] Yes  [x] No

## 2024-12-06 PROBLEM — Z00.00 WELL ADULT EXAM: Status: RESOLVED | Noted: 2024-11-06 | Resolved: 2024-12-06

## 2024-12-26 ENCOUNTER — TELEPHONE (OUTPATIENT)
Dept: ENDOCRINOLOGY | Age: 64
End: 2024-12-26

## 2024-12-26 NOTE — TELEPHONE ENCOUNTER
Pt feels he has a hit a plateau on weightloss with th Rybelsus. Would like to stop it for a bit, wanted to know if that was ok or if there was a process for it. Please advise.

## 2024-12-30 RX ORDER — ORAL SEMAGLUTIDE 14 MG/1
TABLET ORAL
Qty: 30 TABLET | Refills: 11 | Status: SHIPPED | OUTPATIENT
Start: 2024-12-30

## 2024-12-30 NOTE — TELEPHONE ENCOUNTER
Pt hasn't been feeling good while being on it, neck pain and a different taste in his mouth. Would prefer to go off of it.

## 2025-01-10 DIAGNOSIS — F51.01 PRIMARY INSOMNIA: ICD-10-CM

## 2025-01-10 RX ORDER — ZOLPIDEM TARTRATE 10 MG/1
10 TABLET ORAL NIGHTLY PRN
Qty: 90 TABLET | Refills: 0 | Status: SHIPPED | OUTPATIENT
Start: 2025-01-10 | End: 2025-04-10

## 2025-01-10 NOTE — TELEPHONE ENCOUNTER
Name of Medication(s) Requested:  Requested Prescriptions     Pending Prescriptions Disp Refills    zolpidem (AMBIEN) 10 MG tablet 90 tablet 0     Sig: Take 1 tablet by mouth nightly as needed for Sleep for up to 90 days. Max Daily Amount: 10 mg       Medication is on current medication list Yes    Dosage and directions were verified? Yes    Quantity verified: 90 day supply     Pharmacy Verified?  Yes    Last Appointment:  11/6/2024    Future appts:  Future Appointments   Date Time Provider Department Center   1/28/2025  8:15 AM Kayode Saunders MD [facility]   5/20/2025  9:15 AM Kostas Gustafson MD [facility]        (If no appt send self scheduling link. .REFILLAPPT)  Scheduling request sent?     [x] Yes  [] No    Does patient need updated?  [] Yes  [x] No

## 2025-01-10 NOTE — TELEPHONE ENCOUNTER
PT stated that he felt a cold coming on and wanted a z-edinson. Offered PT appointment, PT refused.

## 2025-01-28 ENCOUNTER — OFFICE VISIT (OUTPATIENT)
Dept: ENDOCRINOLOGY | Age: 65
End: 2025-01-28
Payer: COMMERCIAL

## 2025-01-28 VITALS
BODY MASS INDEX: 28.77 KG/M2 | HEIGHT: 70 IN | SYSTOLIC BLOOD PRESSURE: 135 MMHG | TEMPERATURE: 97.4 F | HEART RATE: 64 BPM | DIASTOLIC BLOOD PRESSURE: 81 MMHG | RESPIRATION RATE: 18 BRPM | OXYGEN SATURATION: 99 % | WEIGHT: 201 LBS

## 2025-01-28 DIAGNOSIS — Z91.119 DIETARY NONCOMPLIANCE: ICD-10-CM

## 2025-01-28 DIAGNOSIS — E11.9 TYPE 2 DIABETES MELLITUS WITHOUT COMPLICATION, WITHOUT LONG-TERM CURRENT USE OF INSULIN (HCC): Primary | ICD-10-CM

## 2025-01-28 DIAGNOSIS — E78.5 HYPERLIPIDEMIA, UNSPECIFIED HYPERLIPIDEMIA TYPE: ICD-10-CM

## 2025-01-28 DIAGNOSIS — E55.9 VITAMIN D DEFICIENCY: ICD-10-CM

## 2025-01-28 DIAGNOSIS — E78.2 MIXED HYPERLIPIDEMIA: ICD-10-CM

## 2025-01-28 DIAGNOSIS — Z97.8 USES SELF-APPLIED CONTINUOUS GLUCOSE MONITORING DEVICE: ICD-10-CM

## 2025-01-28 LAB — HBA1C MFR BLD: 7.9 %

## 2025-01-28 PROCEDURE — 3075F SYST BP GE 130 - 139MM HG: CPT | Performed by: INTERNAL MEDICINE

## 2025-01-28 PROCEDURE — 3051F HG A1C>EQUAL 7.0%<8.0%: CPT | Performed by: INTERNAL MEDICINE

## 2025-01-28 PROCEDURE — 3079F DIAST BP 80-89 MM HG: CPT | Performed by: INTERNAL MEDICINE

## 2025-01-28 PROCEDURE — 99214 OFFICE O/P EST MOD 30 MIN: CPT | Performed by: INTERNAL MEDICINE

## 2025-01-28 PROCEDURE — 83036 HEMOGLOBIN GLYCOSYLATED A1C: CPT | Performed by: INTERNAL MEDICINE

## 2025-01-28 PROCEDURE — G2211 COMPLEX E/M VISIT ADD ON: HCPCS | Performed by: INTERNAL MEDICINE

## 2025-01-28 NOTE — PROGRESS NOTES
[facility] Department of Endocrinology Diabetes and Metabolism   [address] [place] [id number]   Phone: [phone]  Fax: [phone]    Date of Service: 1/28/2025  Primary Care Physician: Kostas Gustafson MD  Provider: Kayode Saunders MD     History of Present Illness:  The history is provided by the patient. No  was used. Accuracy of the patient data is excellent.  Tate Sparks is a very pleasant 64 y.o.  y.o. male seen today for follow up visit   Pt complaining of denies       Tate Sparks was diagnosed with diabetes at age 55 and currently on Metformin 2000 Mg daily,  Jardiance 25 mg daily     The patient was supposed to be on Rybelsus but he was not taking it.    Checks BG 1-2 times/day and readings at goal most of the time    Most recent A1c results summarized below  Lab Results   Component Value Date/Time    LABA1C 7.9 01/28/2025 08:55 AM    LABA1C 8.1 11/05/2024 11:22 AM    LABA1C 7.3 09/12/2024 11:01 AM     Patient has had no hypoglycemic episodes   The patient has been mindful of what has been eating and following diabetic diet as encouraged  I reviewed current medications and the patient has no issues with diabetes medications  Tate Sparks is up to date with eye exam and denied any history of diabetic retinopathy  Dr Quintana   Does perform own foot exams     Microvascular complications:  No Retinopathy, Nephropathy + Neuropathy   Macrovascular complications: no CAD, PVD, or Stroke  The patient refuses Flushot and pneumonia vaccine     PAST MEDICAL HISTORY   Past Medical History:   Diagnosis Date    Hyperlipidemia     Hypertension     Type 2 diabetes mellitus without complication (HCC)        PAST SURGICAL HISTORY   No past surgical history on file.    SOCIAL HISTORY   Tobacco:   reports that he has never smoked. He has never used smokeless tobacco.  Alcohol:   reports current alcohol use.  Drugs:   reports no history of drug

## 2025-03-19 DIAGNOSIS — E11.9 TYPE 2 DIABETES MELLITUS WITHOUT COMPLICATION, WITHOUT LONG-TERM CURRENT USE OF INSULIN: Primary | ICD-10-CM

## 2025-03-19 NOTE — TELEPHONE ENCOUNTER
Send rybelsus to [facility]  =, change pharmacy   1/28/25: Restart Rybelsus 3 mf daily then increase to 7 mg daily then eventually to 14 mg daily as tolerated

## 2025-03-20 RX ORDER — ORAL SEMAGLUTIDE 14 MG/1
TABLET ORAL
Qty: 30 TABLET | Refills: 5 | Status: SHIPPED | OUTPATIENT
Start: 2025-03-20

## 2025-04-01 DIAGNOSIS — F51.01 PRIMARY INSOMNIA: ICD-10-CM

## 2025-04-01 RX ORDER — ZOLPIDEM TARTRATE 10 MG/1
10 TABLET ORAL NIGHTLY PRN
Qty: 90 TABLET | Refills: 0 | Status: SHIPPED | OUTPATIENT
Start: 2025-04-01 | End: 2025-06-30

## 2025-04-01 NOTE — TELEPHONE ENCOUNTER
PT CALLED REQUESTING REFILL FOR AMBIEN 10 MG #90 ONE TAB NIGHTLY WITH 0 REFILLS.    [facility].

## 2025-04-01 NOTE — TELEPHONE ENCOUNTER
Name of Medication(s) Requested:  Requested Prescriptions     Pending Prescriptions Disp Refills    zolpidem (AMBIEN) 10 MG tablet 90 tablet 0     Sig: Take 1 tablet by mouth nightly as needed for Sleep for up to 90 days. Max Daily Amount: 10 mg       Medication is on current medication list Yes    Dosage and directions were verified? Yes    Quantity verified: 90 day supply     Pharmacy Verified?  Yes    Last Appointment:  11/6/2024    Future appts:  Future Appointments   Date Time Provider Department Center   5/13/2025  8:45 AM Kayode Saunders MD [facility]   5/20/2025  9:15 AM Kostas Gustafson MD [facility]        (If no appt send self scheduling link. .REFILLAPPT)  Scheduling request sent?     [x] Yes  [] No    Does patient need updated?  [] Yes  [x] No

## 2025-04-14 DIAGNOSIS — E78.5 HYPERLIPIDEMIA, UNSPECIFIED HYPERLIPIDEMIA TYPE: ICD-10-CM

## 2025-04-14 RX ORDER — ROSUVASTATIN CALCIUM 10 MG/1
10 TABLET, COATED ORAL DAILY
Qty: 90 TABLET | Refills: 3 | Status: SHIPPED | OUTPATIENT
Start: 2025-04-14 | End: 2026-04-09

## 2025-04-14 NOTE — TELEPHONE ENCOUNTER
Name of Medication(s) Requested:  Requested Prescriptions     Pending Prescriptions Disp Refills    rosuvastatin (CRESTOR) 10 MG tablet 90 tablet 3     Sig: Take 1 tablet by mouth daily       Medication is on current medication list Yes    Dosage and directions were verified? Yes    Quantity verified: 90 day supply     Pharmacy Verified?  Yes    Last Appointment:  11/6/2024    Future appts:  Future Appointments   Date Time Provider Department Center   5/13/2025  8:45 AM Kayode Saunders MD [facility]   5/20/2025  9:15 AM Kostas Gustafson MD [facility]        (If no appt send self scheduling link. .REFILLAPPT)  Scheduling request sent?     [x] Yes  [] No    Does patient need updated?  [] Yes  [x] No   yes...

## 2025-05-13 ENCOUNTER — OFFICE VISIT (OUTPATIENT)
Dept: ENDOCRINOLOGY | Age: 65
End: 2025-05-13
Payer: COMMERCIAL

## 2025-05-13 VITALS
DIASTOLIC BLOOD PRESSURE: 79 MMHG | HEART RATE: 76 BPM | WEIGHT: 194 LBS | BODY MASS INDEX: 27.77 KG/M2 | TEMPERATURE: 97.4 F | SYSTOLIC BLOOD PRESSURE: 127 MMHG | RESPIRATION RATE: 18 BRPM | HEIGHT: 70 IN | OXYGEN SATURATION: 99 %

## 2025-05-13 DIAGNOSIS — E11.9 TYPE 2 DIABETES MELLITUS WITHOUT COMPLICATION, WITHOUT LONG-TERM CURRENT USE OF INSULIN (HCC): Primary | ICD-10-CM

## 2025-05-13 DIAGNOSIS — Z91.119 DIETARY NONCOMPLIANCE: ICD-10-CM

## 2025-05-13 DIAGNOSIS — E78.5 HYPERLIPIDEMIA, UNSPECIFIED HYPERLIPIDEMIA TYPE: ICD-10-CM

## 2025-05-13 DIAGNOSIS — Z97.8 USES SELF-APPLIED CONTINUOUS GLUCOSE MONITORING DEVICE: ICD-10-CM

## 2025-05-13 DIAGNOSIS — E78.2 MIXED HYPERLIPIDEMIA: ICD-10-CM

## 2025-05-13 LAB — HBA1C MFR BLD: 7.6 %

## 2025-05-13 PROCEDURE — 99214 OFFICE O/P EST MOD 30 MIN: CPT | Performed by: INTERNAL MEDICINE

## 2025-05-13 PROCEDURE — G2211 COMPLEX E/M VISIT ADD ON: HCPCS | Performed by: INTERNAL MEDICINE

## 2025-05-13 PROCEDURE — 83036 HEMOGLOBIN GLYCOSYLATED A1C: CPT | Performed by: INTERNAL MEDICINE

## 2025-05-13 PROCEDURE — 3074F SYST BP LT 130 MM HG: CPT | Performed by: INTERNAL MEDICINE

## 2025-05-13 PROCEDURE — 3051F HG A1C>EQUAL 7.0%<8.0%: CPT | Performed by: INTERNAL MEDICINE

## 2025-05-13 PROCEDURE — 3078F DIAST BP <80 MM HG: CPT | Performed by: INTERNAL MEDICINE

## 2025-05-13 RX ORDER — ACYCLOVIR 800 MG/1
TABLET ORAL
Qty: 6 EACH | Refills: 3 | Status: SHIPPED | OUTPATIENT
Start: 2025-05-13

## 2025-05-13 NOTE — PROGRESS NOTES
[facility] Department of Endocrinology Diabetes and Metabolism   [address] [place] [address]   Phone: [phone]  Fax: [phone]    Date of Service: 5/13/2025  Primary Care Physician: Kostas Gustafson MD  Provider: Kayode Saunders MD     History of Present Illness:  The history is provided by the patient. No  was used. Accuracy of the patient data is excellent.  Tate Sparks is a very pleasant 64 y.o.  y.o. male seen today for follow up visit   Pt complaining of denies       Tate Sparks was diagnosed with diabetes at age 55 and currently on Metformin ER 2000 Mg daily, Rybelsus 14 mg daily, Jardiance 25 mg daily       The patient has been checking glucose multiple times daily and reading has been in the range most of the time.  Most recent A1c results summarized below  Lab Results   Component Value Date/Time    LABA1C 7.6 05/13/2025 08:54 AM    LABA1C 7.9 01/28/2025 08:55 AM    LABA1C 8.1 11/05/2024 11:22 AM     Patient has had no hypoglycemic episodes   The patient has been mindful of what has been eating and following diabetic diet as encouraged  I reviewed current medications and the patient has no issues with diabetes medications  Tate Sparks is up to date with eye exam and denied any history of diabetic retinopathy  Dr Quintana   Does perform own foot exams     Microvascular complications:  No Retinopathy, Nephropathy + Neuropathy   Macrovascular complications: no CAD, PVD, or Stroke  The patient refuses Flushot and pneumonia vaccine     PAST MEDICAL HISTORY   Past Medical History:   Diagnosis Date    Hyperlipidemia     Hypertension     Type 2 diabetes mellitus without complication (HCC)        PAST SURGICAL HISTORY   No past surgical history on file.    SOCIAL HISTORY   Tobacco:   reports that he has never smoked. He has never used smokeless tobacco.  Alcohol:   reports current alcohol use.  Drugs:   reports no history of drug

## 2025-05-28 ENCOUNTER — OFFICE VISIT (OUTPATIENT)
Age: 65
End: 2025-05-28
Payer: COMMERCIAL

## 2025-05-28 VITALS
SYSTOLIC BLOOD PRESSURE: 122 MMHG | RESPIRATION RATE: 18 BRPM | WEIGHT: 198 LBS | OXYGEN SATURATION: 97 % | BODY MASS INDEX: 28.35 KG/M2 | HEIGHT: 70 IN | DIASTOLIC BLOOD PRESSURE: 80 MMHG | TEMPERATURE: 97.5 F | HEART RATE: 69 BPM

## 2025-05-28 DIAGNOSIS — Z12.5 SCREENING FOR PROSTATE CANCER: ICD-10-CM

## 2025-05-28 DIAGNOSIS — E03.9 HYPOTHYROIDISM, UNSPECIFIED TYPE: ICD-10-CM

## 2025-05-28 DIAGNOSIS — F51.01 PRIMARY INSOMNIA: ICD-10-CM

## 2025-05-28 DIAGNOSIS — D12.6 TUBULAR ADENOMA OF COLON: ICD-10-CM

## 2025-05-28 DIAGNOSIS — E11.9 TYPE 2 DIABETES MELLITUS WITHOUT COMPLICATION, WITHOUT LONG-TERM CURRENT USE OF INSULIN (HCC): ICD-10-CM

## 2025-05-28 DIAGNOSIS — I10 ESSENTIAL HYPERTENSION, BENIGN: Primary | ICD-10-CM

## 2025-05-28 DIAGNOSIS — E78.5 HYPERLIPIDEMIA, UNSPECIFIED HYPERLIPIDEMIA TYPE: ICD-10-CM

## 2025-05-28 PROCEDURE — 99214 OFFICE O/P EST MOD 30 MIN: CPT | Performed by: FAMILY MEDICINE

## 2025-05-28 PROCEDURE — 3051F HG A1C>EQUAL 7.0%<8.0%: CPT | Performed by: FAMILY MEDICINE

## 2025-05-28 PROCEDURE — 3079F DIAST BP 80-89 MM HG: CPT | Performed by: FAMILY MEDICINE

## 2025-05-28 PROCEDURE — 3074F SYST BP LT 130 MM HG: CPT | Performed by: FAMILY MEDICINE

## 2025-05-28 RX ORDER — ZOLPIDEM TARTRATE 10 MG/1
10 TABLET ORAL NIGHTLY PRN
Qty: 90 TABLET | Refills: 0 | Status: CANCELLED | OUTPATIENT
Start: 2025-05-28 | End: 2025-08-26

## 2025-05-28 SDOH — ECONOMIC STABILITY: FOOD INSECURITY: WITHIN THE PAST 12 MONTHS, THE FOOD YOU BOUGHT JUST DIDN'T LAST AND YOU DIDN'T HAVE MONEY TO GET MORE.: NEVER TRUE

## 2025-05-28 SDOH — ECONOMIC STABILITY: FOOD INSECURITY: WITHIN THE PAST 12 MONTHS, YOU WORRIED THAT YOUR FOOD WOULD RUN OUT BEFORE YOU GOT MONEY TO BUY MORE.: NEVER TRUE

## 2025-05-28 ASSESSMENT — PATIENT HEALTH QUESTIONNAIRE - PHQ9
2. FEELING DOWN, DEPRESSED OR HOPELESS: NOT AT ALL
SUM OF ALL RESPONSES TO PHQ QUESTIONS 1-9: 0
1. LITTLE INTEREST OR PLEASURE IN DOING THINGS: NOT AT ALL

## 2025-05-28 NOTE — PROGRESS NOTES
Tate Sparks (:  1960) is a 64 y.o. male,Established patient, here for evaluation of the following chief complaint(s):  Check-Up (Six month check up. )         Assessment & Plan  Essential hypertension, benign   Chronic, at goal (stable), continue current treatment plan         Type 2 diabetes mellitus without complication, without long-term current use of insulin (HCC)   Monitored by specialist- no acute findings meriting change in the plan         Hyperlipidemia, unspecified hyperlipidemia type   Chronic, at goal (stable), continue current treatment plan         Hypothyroidism, unspecified type   Chronic, at goal (stable), continue current treatment plan         Primary insomnia            Tubular adenoma of colon    Patient will call Dr. Claros to schedule appointment         Screening for prostate cancer       Orders:    PSA Screening; Future      Return in about 6 months (around 2025) for physical.       Subjective   HPI  64-year-old comes in for 6-month checkup.  He is treated for hypertension diabetes hyperlipidemia hypothyroidism.  He is seeing endocrinology A1c was 7.6 he is on Rybelsus he has lost over 25 pounds he is feeling better.  Eye exam is up-to-date colonoscopy in  did show 2 tubular adenomas according to my records he is due now he will contact his endoscopist to schedule an appointment.  Eye exams are up-to-date.  Overall he feels well after losing weight.  Review of Systems   All other systems reviewed and are negative.         Objective   Physical Exam  Vitals reviewed.   Constitutional:       General: He is not in acute distress.     Appearance: He is not ill-appearing or toxic-appearing.   HENT:      Head: Normocephalic and atraumatic.      Right Ear: Tympanic membrane and ear canal normal.      Left Ear: Tympanic membrane and ear canal normal.      Mouth/Throat:      Mouth: Mucous membranes are moist.      Pharynx: Oropharynx is clear.   Eyes:      General: No

## 2025-06-10 ENCOUNTER — TELEPHONE (OUTPATIENT)
Dept: ENDOCRINOLOGY | Age: 65
End: 2025-06-10

## 2025-06-17 DIAGNOSIS — F51.01 PRIMARY INSOMNIA: ICD-10-CM

## 2025-06-17 NOTE — TELEPHONE ENCOUNTER
Name of Medication(s) Requested:  Requested Prescriptions     Pending Prescriptions Disp Refills    zolpidem (AMBIEN) 10 MG tablet 90 tablet 0     Sig: Take 1 tablet by mouth nightly as needed for Sleep for up to 90 days. Max Daily Amount: 10 mg       Medication is on current medication list Yes    Dosage and directions were verified? Yes    Quantity verified: 90 day supply     Pharmacy Verified?  Yes    Last Appointment:  5/28/2025    Future appts:  Future Appointments   Date Time Provider Department Center   9/16/2025  1:00 PM Kayode Saunders MD [facility]   12/1/2025  8:00 AM Kostas Gustafson MD [facility]        (If no appt send self scheduling link. .REFILLAPPT)  Scheduling request sent?     [x] Yes  [] No    Does patient need updated?  [] Yes  [x] No

## 2025-06-19 RX ORDER — ZOLPIDEM TARTRATE 10 MG/1
10 TABLET ORAL NIGHTLY PRN
Qty: 90 TABLET | Refills: 0 | Status: SHIPPED | OUTPATIENT
Start: 2025-06-19 | End: 2025-09-17

## 2025-06-27 PROBLEM — Z12.5 SCREENING FOR PROSTATE CANCER: Status: RESOLVED | Noted: 2025-05-28 | Resolved: 2025-06-27

## 2025-07-09 DIAGNOSIS — E11.9 TYPE 2 DIABETES MELLITUS WITHOUT COMPLICATION, WITHOUT LONG-TERM CURRENT USE OF INSULIN (HCC): ICD-10-CM

## 2025-07-09 RX ORDER — ORAL SEMAGLUTIDE 14 MG/1
1 TABLET ORAL DAILY
Qty: 90 TABLET | Refills: 1 | Status: SHIPPED | OUTPATIENT
Start: 2025-07-09

## 2025-09-02 RX ORDER — SILDENAFIL 100 MG/1
100 TABLET, FILM COATED ORAL PRN
Qty: 30 TABLET | Refills: 2 | Status: SHIPPED | OUTPATIENT
Start: 2025-09-02